# Patient Record
Sex: MALE | Race: WHITE | Employment: OTHER | URBAN - METROPOLITAN AREA
[De-identification: names, ages, dates, MRNs, and addresses within clinical notes are randomized per-mention and may not be internally consistent; named-entity substitution may affect disease eponyms.]

---

## 2018-03-26 ENCOUNTER — APPOINTMENT (OUTPATIENT)
Dept: CT IMAGING | Age: 63
DRG: 056 | End: 2018-03-26
Attending: EMERGENCY MEDICINE
Payer: COMMERCIAL

## 2018-03-26 ENCOUNTER — HOSPITAL ENCOUNTER (INPATIENT)
Age: 63
LOS: 1 days | Discharge: HOME | DRG: 056 | End: 2018-03-28
Attending: EMERGENCY MEDICINE | Admitting: INTERNAL MEDICINE
Payer: COMMERCIAL

## 2018-03-26 ENCOUNTER — APPOINTMENT (OUTPATIENT)
Dept: GENERAL RADIOLOGY | Age: 63
DRG: 056 | End: 2018-03-26
Attending: EMERGENCY MEDICINE
Payer: COMMERCIAL

## 2018-03-26 DIAGNOSIS — F02.80 DEMENTIA IN ALZHEIMER'S DISEASE WITH EARLY ONSET WITHOUT BEHAVIORAL DISTURBANCE (HCC): ICD-10-CM

## 2018-03-26 DIAGNOSIS — I65.23 BILATERAL CAROTID ARTERY STENOSIS: ICD-10-CM

## 2018-03-26 DIAGNOSIS — G93.40 ENCEPHALOPATHY ACUTE: ICD-10-CM

## 2018-03-26 DIAGNOSIS — G30.0 DEMENTIA IN ALZHEIMER'S DISEASE WITH EARLY ONSET WITHOUT BEHAVIORAL DISTURBANCE (HCC): ICD-10-CM

## 2018-03-26 DIAGNOSIS — R41.0 DISORIENTATION: Primary | ICD-10-CM

## 2018-03-26 DIAGNOSIS — G45.4 TGA (TRANSIENT GLOBAL AMNESIA): ICD-10-CM

## 2018-03-26 DIAGNOSIS — R56.9 CONVULSIONS, UNSPECIFIED CONVULSION TYPE (HCC): ICD-10-CM

## 2018-03-26 DIAGNOSIS — R41.82 ALTERED MENTAL STATUS, UNSPECIFIED ALTERED MENTAL STATUS TYPE: ICD-10-CM

## 2018-03-26 DIAGNOSIS — G93.40 ENCEPHALOPATHY: ICD-10-CM

## 2018-03-26 LAB
ALBUMIN SERPL-MCNC: 3.6 G/DL (ref 3.5–5)
ALBUMIN/GLOB SERPL: 1.1 {RATIO} (ref 1.1–2.2)
ALP SERPL-CCNC: 73 U/L (ref 45–117)
ALT SERPL-CCNC: 14 U/L (ref 12–78)
AMPHET UR QL SCN: NEGATIVE
ANION GAP SERPL CALC-SCNC: 10 MMOL/L (ref 5–15)
APPEARANCE UR: CLEAR
AST SERPL-CCNC: 10 U/L (ref 15–37)
BACTERIA URNS QL MICRO: NEGATIVE /HPF
BARBITURATES UR QL SCN: NEGATIVE
BASOPHILS # BLD: 0 K/UL (ref 0–0.1)
BASOPHILS NFR BLD: 0 % (ref 0–1)
BENZODIAZ UR QL: NEGATIVE
BILIRUB SERPL-MCNC: 0.5 MG/DL (ref 0.2–1)
BILIRUB UR QL: NEGATIVE
BUN SERPL-MCNC: 20 MG/DL (ref 6–20)
BUN/CREAT SERPL: 16 (ref 12–20)
CALCIUM SERPL-MCNC: 9.4 MG/DL (ref 8.5–10.1)
CANNABINOIDS UR QL SCN: NEGATIVE
CHLORIDE SERPL-SCNC: 101 MMOL/L (ref 97–108)
CO2 SERPL-SCNC: 26 MMOL/L (ref 21–32)
COCAINE UR QL SCN: NEGATIVE
COLOR UR: ABNORMAL
CREAT SERPL-MCNC: 1.23 MG/DL (ref 0.7–1.3)
DIFFERENTIAL METHOD BLD: ABNORMAL
DRUG SCRN COMMENT,DRGCM: ABNORMAL
EOSINOPHIL # BLD: 0 K/UL (ref 0–0.4)
EOSINOPHIL NFR BLD: 0 % (ref 0–7)
EPITH CASTS URNS QL MICRO: ABNORMAL /LPF
ERYTHROCYTE [DISTWIDTH] IN BLOOD BY AUTOMATED COUNT: 12.7 % (ref 11.5–14.5)
ETHANOL SERPL-MCNC: <10 MG/DL
GLOBULIN SER CALC-MCNC: 3.4 G/DL (ref 2–4)
GLUCOSE BLD STRIP.AUTO-MCNC: 123 MG/DL (ref 65–100)
GLUCOSE SERPL-MCNC: 117 MG/DL (ref 65–100)
GLUCOSE UR STRIP.AUTO-MCNC: NEGATIVE MG/DL
HCT VFR BLD AUTO: 33.7 % (ref 36.6–50.3)
HGB BLD-MCNC: 11.4 G/DL (ref 12.1–17)
HGB UR QL STRIP: NEGATIVE
HYALINE CASTS URNS QL MICRO: ABNORMAL /LPF (ref 0–5)
IMM GRANULOCYTES # BLD: 0 K/UL (ref 0–0.04)
IMM GRANULOCYTES NFR BLD AUTO: 0 % (ref 0–0.5)
KETONES UR QL STRIP.AUTO: NEGATIVE MG/DL
LEUKOCYTE ESTERASE UR QL STRIP.AUTO: ABNORMAL
LYMPHOCYTES # BLD: 0.6 K/UL (ref 0.8–3.5)
LYMPHOCYTES NFR BLD: 11 % (ref 12–49)
MCH RBC QN AUTO: 33.8 PG (ref 26–34)
MCHC RBC AUTO-ENTMCNC: 33.8 G/DL (ref 30–36.5)
MCV RBC AUTO: 100 FL (ref 80–99)
METHADONE UR QL: NEGATIVE
MONOCYTES # BLD: 0.6 K/UL (ref 0–1)
MONOCYTES NFR BLD: 10 % (ref 5–13)
NEUTS SEG # BLD: 4.4 K/UL (ref 1.8–8)
NEUTS SEG NFR BLD: 79 % (ref 32–75)
NITRITE UR QL STRIP.AUTO: NEGATIVE
NRBC # BLD: 0 K/UL (ref 0–0.01)
NRBC BLD-RTO: 0 PER 100 WBC
OPIATES UR QL: POSITIVE
PCP UR QL: NEGATIVE
PH UR STRIP: 7.5 [PH] (ref 5–8)
PLATELET # BLD AUTO: 164 K/UL (ref 150–400)
PMV BLD AUTO: 10.2 FL (ref 8.9–12.9)
POTASSIUM SERPL-SCNC: 4.2 MMOL/L (ref 3.5–5.1)
PROT SERPL-MCNC: 7 G/DL (ref 6.4–8.2)
PROT UR STRIP-MCNC: ABNORMAL MG/DL
RBC # BLD AUTO: 3.37 M/UL (ref 4.1–5.7)
RBC #/AREA URNS HPF: ABNORMAL /HPF (ref 0–5)
RBC MORPH BLD: ABNORMAL
SERVICE CMNT-IMP: ABNORMAL
SODIUM SERPL-SCNC: 137 MMOL/L (ref 136–145)
SP GR UR REFRACTOMETRY: 1.02 (ref 1–1.03)
UA: UC IF INDICATED,UAUC: ABNORMAL
UROBILINOGEN UR QL STRIP.AUTO: 1 EU/DL (ref 0.2–1)
WBC # BLD AUTO: 5.6 K/UL (ref 4.1–11.1)
WBC URNS QL MICRO: ABNORMAL /HPF (ref 0–4)

## 2018-03-26 PROCEDURE — 80307 DRUG TEST PRSMV CHEM ANLYZR: CPT | Performed by: EMERGENCY MEDICINE

## 2018-03-26 PROCEDURE — 36415 COLL VENOUS BLD VENIPUNCTURE: CPT | Performed by: EMERGENCY MEDICINE

## 2018-03-26 PROCEDURE — 71046 X-RAY EXAM CHEST 2 VIEWS: CPT

## 2018-03-26 PROCEDURE — 81001 URINALYSIS AUTO W/SCOPE: CPT | Performed by: EMERGENCY MEDICINE

## 2018-03-26 PROCEDURE — 82962 GLUCOSE BLOOD TEST: CPT

## 2018-03-26 PROCEDURE — 85025 COMPLETE CBC W/AUTO DIFF WBC: CPT | Performed by: EMERGENCY MEDICINE

## 2018-03-26 PROCEDURE — 70450 CT HEAD/BRAIN W/O DYE: CPT

## 2018-03-26 PROCEDURE — 80053 COMPREHEN METABOLIC PANEL: CPT | Performed by: EMERGENCY MEDICINE

## 2018-03-26 PROCEDURE — 94761 N-INVAS EAR/PLS OXIMETRY MLT: CPT

## 2018-03-26 PROCEDURE — 99285 EMERGENCY DEPT VISIT HI MDM: CPT

## 2018-03-26 PROCEDURE — 87086 URINE CULTURE/COLONY COUNT: CPT | Performed by: EMERGENCY MEDICINE

## 2018-03-26 PROCEDURE — 93005 ELECTROCARDIOGRAM TRACING: CPT

## 2018-03-26 NOTE — IP AVS SNAPSHOT
[address] 
[phone] Patient: Hayley Choudhury MRN: [id number] :1955 About your hospitalization You were admitted on:  2018 You last received care in the:  \[facility]\"" 3 NEUROSCIENCE TELEMETRY You were discharged on:  2018 Why you were hospitalized Your primary diagnosis was:  Not on File Your diagnoses also included:  Encephalopathy, Dementia In Alzheimer's Disease With Early Onset Without Behavioral Disturbance, Altered Mental Status, Unspecified, Bilateral Carotid Artery Stenosis, Tga (Transient Global Amnesia) Follow-up Information Follow up With Details Comments Contact Info Laura Magana MD Go on 4/3/2018 Please follow up on April 3, 2018 at 12:30 Cora [address] [place] [address] 
[phone] Discharge Orders None A check leo indicates which time of day the medication should be taken. My Medications START taking these medications Instructions Each Dose to Equal  
 Morning Noon Evening Bedtime  
 donepezil 5 mg tablet Commonly known as:  ARICEPT Your last dose was: Your next dose is: Take 1 Tab by mouth nightly for 30 days. 5 mg CONTINUE taking these medications Instructions Each Dose to Equal  
 Morning Noon Evening Bedtime ABILIFY 5 mg tablet Generic drug:  ARIPiprazole Your last dose was: Your next dose is: Take 5 mg by mouth daily. 5 mg  
    
   
   
   
  
 amLODIPine 5 mg tablet Commonly known as:  Tylene Quoc Your last dose was: Your next dose is: Take 5 mg by mouth daily. 5 mg  
    
   
   
   
  
 buprenorphine-naloxone 8-2 mg Film sublingaul film Commonly known as:  SUBOXONE Your last dose was: Your next dose is:    
   
   
 1 Film by SubLINGual route two (2) times a day. 1 Film CYMBALTA 60 mg capsule Generic drug:  DULoxetine Your last dose was: Your next dose is: Take 60 mg by mouth daily. 60 mg  
    
   
   
   
  
 eszopiclone 3 mg tablet Commonly known as:  Amina Lopez Your last dose was: Your next dose is: Take 3 mg by mouth nightly. 3 mg  
    
   
   
   
  
 folic acid 1 mg tablet Commonly known as:  Google Your last dose was: Your next dose is: Take 1 mg by mouth daily. 1 mg  
    
   
   
   
  
 gabapentin 300 mg capsule Commonly known as:  NEURONTIN Your last dose was: Your next dose is: Take 300 mg by mouth nightly. 300 mg LaMICtal 25 mg tablet Generic drug:  lamoTRIgine Your last dose was: Your next dose is: Take 25 mg by mouth daily. 25 mg  
    
   
   
   
  
 LASIX 20 mg tablet Generic drug:  furosemide Your last dose was: Your next dose is: Take 20 mg by mouth daily. 20 mg  
    
   
   
   
  
 lisinopril-hydroCHLOROthiazide 10-12.5 mg per tablet Commonly known as:  Fredrick Rival Your last dose was: Your next dose is: Take 1 Tab by mouth daily. 1 Tab PROAIR HFA 90 mcg/actuation inhaler Generic drug:  albuterol Your last dose was: Your next dose is: Take 2 Puffs by inhalation every four (4) hours as needed for Wheezing. 2 Puff SINGULAIR 10 mg tablet Generic drug:  montelukast  
   
Your last dose was: Your next dose is: Take 10 mg by mouth daily. 10 mg  
    
   
   
   
  
 spironolactone 50 mg tablet Commonly known as:  ALDACTONE Your last dose was: Your next dose is: Take 50 mg by mouth daily. 50 mg  
    
   
   
   
  
 traZODone 150 mg tablet Commonly known as:  Cassie Boldemartobi Your last dose was: Your next dose is: Take 300 mg by mouth nightly. 300 mg ZYLOPRIM 300 mg tablet Generic drug:  allopurinol Your last dose was: Your next dose is: Take 300 mg by mouth daily. 300 mg Where to Get Your Medications Information on where to get these meds will be given to you by the nurse or doctor. ! Ask your nurse or doctor about these medications  
  donepezil 5 mg tablet Opioid Education Prescription Opioids: What You Need to Know: 
 
Prescription opioids can be used to help relieve moderate-to-severe pain and are often prescribed following a surgery or injury, or for certain health conditions. These medications can be an important part of treatment but also come with serious risks. Opioids are strong pain medicines. Examples include hydrocodone, oxycodone, fentanyl, and morphine. Heroin is an example of an illegal opioid. It is important to work with your health care provider to make sure you are getting the safest, most effective care. WHAT ARE THE RISKS AND SIDE EFFECTS OF OPIOID USE? Prescription opioids carry serious risks of addiction and overdose, especially with prolonged use. An opioid overdose, often marked by slow breathing, can cause sudden death. The use of prescription opioids can have a number of side effects as well, even when taken as directed. · Tolerance-meaning you might need to take more of a medication for the same pain relief · Physical dependence-meaning you have symptoms of withdrawal when the medication is stopped. Withdrawal symptoms can include nausea, sweating, chills, diarrhea, stomach cramps, and muscle aches. Withdrawal can last up to several weeks, depending on which drug you took and how long you took it. · Increased sensitivity to pain · Constipation · Nausea, vomiting, and dry mouth · Sleepiness and dizziness · Confusion · Depression · Low levels of testosterone that can result in lower sex drive, energy, and strength · Itching and sweating RISKS ARE GREATER WITH:      
· History of drug misuse, substance use disorder, or overdose · Mental health conditions (such as depression or anxiety) · Sleep apnea · Older age (72 years or older) · Pregnancy Avoid alcohol while taking prescription opioids. Also, unless specifically advised by your health care provider, medications to avoid include: · Benzodiazepines (such as Xanax or Valium) · Muscle relaxants (such as Soma or Flexeril) · Hypnotics (such as Ambien or Lunesta) · Other prescription opioids KNOW YOUR OPTIONS Talk to your health care provider about ways to manage your pain that don't involve prescription opioids. Some of these options may actually work better and have fewer risks and side effects. Options may include: 
· Pain relievers such as acetaminophen, ibuprofen, and naproxen · Some medications that are also used for depression or seizures · Physical therapy and exercise · Counseling to help patients learn how to cope better with triggers of pain and stress. · Application of heat or cold compress · Massage therapy · Relaxation techniques Be Informed Make sure you know the name of your medication, how much and how often to take it, and its potential risks & side effects. IF YOU ARE PRESCRIBED OPIOIDS FOR PAIN: 
· Never take opioids in greater amounts or more often than prescribed. Remember the goal is not to be pain-free but to manage your pain at a tolerable level. · Follow up with your primary care provider to: · Work together to create a plan on how to manage your pain. · Talk about ways to help manage your pain that don't involve prescription opioids. · Talk about any and all concerns and side effects. · Help prevent misuse and abuse. · Never sell or share prescription opioids · Help prevent misuse and abuse. · Store prescription opioids in a secure place and out of reach of others (this may include visitors, children, friends, and family). · Safely dispose of unused/unwanted prescription opioids: Find your community drug take-back program or your pharmacy mail-back program, or flush them down the toilet, following guidance from the Food and Drug Administration (www.fda.gov/Drugs/ResourcesForYou). · Visit www.cdc.gov/drugoverdose to learn about the risks of opioid abuse and overdose. · If you believe you may be struggling with addiction, tell your health care provider and ask for guidance or call [facility] at 1-845-570-UYGV. Discharge Instructions None GeekChicDaily Announcement We are excited to announce that we are making your provider's discharge notes available to you in GeekChicDaily. You will see these notes when they are completed and signed by the physician that discharged you from your recent hospital stay. If you have any questions or concerns about any information you see in GeekChicDaily, please call the Health Information Department where you were seen or reach out to your Primary Care Provider for more information about your plan of care. Introducing [facility]! [facility] introduces GeekChicDaily patient portal. Now you can access parts of your medical record, email your doctor's office, and request medication refills online. 1. In your internet browser, go to [email or web address] 2. Click on the First Time User? Click Here link in the Sign In box. You will see the New Member Sign Up page. 3. Enter your GeekChicDaily Access Code exactly as it appears below. You will not need to use this code after youve completed the sign-up process. If you do not sign up before the expiration date, you must request a new code. · Context app Access Code: [email or web address] Expires: 6/25/2018  5:25 PM 
 
4. Enter the last four digits of your Social Security Number (xxxx) and Date of Birth (mm/dd/yyyy) as indicated and click Submit. You will be taken to the next sign-up page. 5. Create a Locondo.jpt ID. This will be your Context app login ID and cannot be changed, so think of one that is secure and easy to remember. 6. Create a Context app password. You can change your password at any time. 7. Enter your Password Reset Question and Answer. This can be used at a later time if you forget your password. 8. Enter your e-mail address. You will receive e-mail notification when new information is available in [address] 9. Click Sign Up. You can now view and download portions of your medical record. 10. Click the Download Summary menu link to download a portable copy of your medical information. If you have questions, please visit the Frequently Asked Questions section of the Context app website. Remember, Context app is NOT to be used for urgent needs. For medical emergencies, dial 911. Now available from your iPhone and Android! Introducing Bharathi Brink As a Jocelin José patient, I wanted to make you aware of our electronic visit tool called Bharathi Richardmargo. Jocelin Clintonne 24/7 allows you to connect within minutes with a medical provider 24 hours a day, seven days a week via a mobile device or tablet or logging into a secure website from your computer. You can access Bharathi Cuba from anywhere in the [place]. A virtual visit might be right for you when you have a simple condition and feel like you just dont want to get out of bed, or cant get away from work for an appointment, when your regular Jocelin Benne provider is not available (evenings, weekends or holidays), or when youre out of town and need minor care.   Electronic visits cost only $49 and if the [facility] StarMobile 24/7 provider determines a prescription is needed to treat your condition, one can be electronically transmitted to a nearby pharmacy*. Please take a moment to enroll today if you have not already done so. The enrollment process is free and takes just a few minutes. To enroll, please download the Swiftcourt/Particle Code katina to your tablet or phone, or visit [email or web address] to enroll on your computer. And, as an [address] [place] [address] patient with a Odersun account, the results of your visits will be scanned into your electronic medical record and your primary care provider will be able to view the scanned results. We urge you to continue to see your regular [facility] provider for your ongoing medical care. And while your primary care provider may not be the one available when you seek a Innohat virtual visit, the peace of mind you get from getting a real diagnosis real time can be priceless. For more information on Innohat, view our Frequently Asked Questions (FAQs) at [email or web address]. Sincerely, 
 
El Macario MD 
Chief Medical Officer [facility] Natasha Richardson *:  certain medications cannot be prescribed via Innohat Unresulted Labs-Please follow up with your PCP about these lab tests Order Current Status JUDE QL, W/REFLEX CASCADE In process ANTINEURONAL CELL AB In process VITAMIN D, 25 HYROXY PANEL In process Providers Seen During Your Hospitalization Provider Specialty Primary office phone David Sexton MD Emergency Medicine [phone] Traci Braun MD Internal Medicine [phone] Janet Hodgson MD Internal Medicine [phone] Your Primary Care Physician (PCP) Primary Care Physician Office Phone Office Fax Alton Ayala [phone] You are allergic to the following No active allergies Recent Documentation Height Weight BMI Smoking Status 1.778 m 81.6 kg 25.83 kg/m2 Never Smoker Emergency Contacts Name Discharge Info Relation Home Work Mobile Lashay Corado N/A  AT THIS TIME [6] Spouse [3] [phone] Lockjaydakimberly Negro N/A  AT THIS TIME [6] Son [22] [phone] Patient Belongings The following personal items are in your possession at time of discharge: 
  Dental Appliances: None  Visual Aid: Glasses, At home      Home Medications: None   Jewelry: None  Clothing: At bedside    Other Valuables: Cell Phone, [employer] Please provide this summary of care documentation to your next provider. Signatures-by signing, you are acknowledging that this After Visit Summary has been reviewed with you and you have received a copy. Patient Signature:  ____________________________________________________________ Date:  ____________________________________________________________  
  
[facility] Provider Signature:  ____________________________________________________________ Date:  ____________________________________________________________

## 2018-03-26 NOTE — IP AVS SNAPSHOT
[address] [facility] 
[phone] Patient: Jesus Brito MRN: [id number] :1955 A check leo indicates which time of day the medication should be taken. My Medications ASK your doctor about these medications Instructions Each Dose to Equal  
 Morning Noon Evening Bedtime ABILIFY 5 mg tablet Generic drug:  ARIPiprazole Your last dose was: Your next dose is: Take 5 mg by mouth daily. 5 mg  
    
   
   
   
  
 amLODIPine 5 mg tablet Commonly known as:  Talya Ro Your last dose was: Your next dose is: Take 5 mg by mouth daily. 5 mg  
    
   
   
   
  
 buprenorphine-naloxone 8-2 mg Film sublingaul film Commonly known as:  SUBOXONE Your last dose was: Your next dose is:    
   
   
 1 Film by SubLINGual route two (2) times a day. 1 Film CYMBALTA 60 mg capsule Generic drug:  DULoxetine Your last dose was: Your next dose is: Take 60 mg by mouth daily. 60 mg  
    
   
   
   
  
 eszopiclone 3 mg tablet Commonly known as:  Keagan Roblero Your last dose was: Your next dose is: Take 3 mg by mouth nightly. 3 mg  
    
   
   
   
  
 folic acid 1 mg tablet Commonly known as:  Google Your last dose was: Your next dose is: Take 1 mg by mouth daily. 1 mg  
    
   
   
   
  
 gabapentin 300 mg capsule Commonly known as:  NEURONTIN Your last dose was: Your next dose is: Take 300 mg by mouth nightly. 300 mg LaMICtal 25 mg tablet Generic drug:  lamoTRIgine Your last dose was: Your next dose is: Take 25 mg by mouth daily. 25 mg  
    
   
   
   
  
 LASIX 20 mg tablet Generic drug:  furosemide Your last dose was: Your next dose is: Take 20 mg by mouth daily. 20 mg  
    
   
   
   
  
 lisinopril-hydroCHLOROthiazide 10-12.5 mg per tablet Commonly known as:  Luz Matute Your last dose was: Your next dose is: Take 1 Tab by mouth daily. 1 Tab PROAIR HFA 90 mcg/actuation inhaler Generic drug:  albuterol Your last dose was: Your next dose is: Take 2 Puffs by inhalation every four (4) hours as needed for Wheezing. 2 Puff SINGULAIR 10 mg tablet Generic drug:  montelukast  
   
Your last dose was: Your next dose is: Take 10 mg by mouth daily. 10 mg  
    
   
   
   
  
 spironolactone 50 mg tablet Commonly known as:  ALDACTONE Your last dose was: Your next dose is: Take 50 mg by mouth daily. 50 mg  
    
   
   
   
  
 traZODone 150 mg tablet Commonly known as:  Jose A Hernandez Your last dose was: Your next dose is: Take 300 mg by mouth nightly. 300 mg ZYLOPRIM 300 mg tablet Generic drug:  allopurinol Your last dose was: Your next dose is: Take 300 mg by mouth daily.   
 300 mg

## 2018-03-27 ENCOUNTER — APPOINTMENT (OUTPATIENT)
Dept: MRI IMAGING | Age: 63
DRG: 056 | End: 2018-03-27
Attending: PSYCHIATRY & NEUROLOGY
Payer: COMMERCIAL

## 2018-03-27 PROBLEM — G30.0 DEMENTIA IN ALZHEIMER'S DISEASE WITH EARLY ONSET WITHOUT BEHAVIORAL DISTURBANCE (HCC): Status: ACTIVE | Noted: 2018-03-27

## 2018-03-27 PROBLEM — G93.40 ENCEPHALOPATHY: Status: ACTIVE | Noted: 2018-03-27

## 2018-03-27 PROBLEM — G45.4 TGA (TRANSIENT GLOBAL AMNESIA): Status: ACTIVE | Noted: 2018-03-27

## 2018-03-27 PROBLEM — F02.80 DEMENTIA IN ALZHEIMER'S DISEASE WITH EARLY ONSET WITHOUT BEHAVIORAL DISTURBANCE (HCC): Status: ACTIVE | Noted: 2018-03-27

## 2018-03-27 PROBLEM — R41.82 ALTERED MENTAL STATUS, UNSPECIFIED: Status: ACTIVE | Noted: 2018-03-27

## 2018-03-27 PROBLEM — I65.23 BILATERAL CAROTID ARTERY STENOSIS: Status: ACTIVE | Noted: 2018-03-27

## 2018-03-27 LAB
ATRIAL RATE: 105 BPM
CALCULATED P AXIS, ECG09: 15 DEGREES
CALCULATED R AXIS, ECG10: -9 DEGREES
CALCULATED T AXIS, ECG11: 10 DEGREES
DIAGNOSIS, 93000: NORMAL
P-R INTERVAL, ECG05: 168 MS
Q-T INTERVAL, ECG07: 350 MS
QRS DURATION, ECG06: 70 MS
QTC CALCULATION (BEZET), ECG08: 462 MS
TSH SERPL DL<=0.05 MIU/L-ACNC: 4.84 UIU/ML (ref 0.36–3.74)
VENTRICULAR RATE, ECG03: 105 BPM
VIT B12 SERPL-MCNC: 301 PG/ML (ref 211–911)

## 2018-03-27 PROCEDURE — 84443 ASSAY THYROID STIM HORMONE: CPT | Performed by: INTERNAL MEDICINE

## 2018-03-27 PROCEDURE — 65270000029 HC RM PRIVATE

## 2018-03-27 PROCEDURE — 77030029684 HC NEB SM VOL KT MONA -A

## 2018-03-27 PROCEDURE — 70551 MRI BRAIN STEM W/O DYE: CPT

## 2018-03-27 PROCEDURE — 95816 EEG AWAKE AND DROWSY: CPT | Performed by: PSYCHIATRY & NEUROLOGY

## 2018-03-27 PROCEDURE — 82607 VITAMIN B-12: CPT | Performed by: INTERNAL MEDICINE

## 2018-03-27 PROCEDURE — 74011000250 HC RX REV CODE- 250: Performed by: GENERAL ACUTE CARE HOSPITAL

## 2018-03-27 PROCEDURE — 36415 COLL VENOUS BLD VENIPUNCTURE: CPT | Performed by: INTERNAL MEDICINE

## 2018-03-27 PROCEDURE — 94640 AIRWAY INHALATION TREATMENT: CPT

## 2018-03-27 RX ORDER — BUPRENORPHINE AND NALOXONE 8; 2 MG/1; MG/1
1 FILM, SOLUBLE BUCCAL; SUBLINGUAL 2 TIMES DAILY
COMMUNITY

## 2018-03-27 RX ORDER — LISINOPRIL AND HYDROCHLOROTHIAZIDE 10; 12.5 MG/1; MG/1
1 TABLET ORAL DAILY
COMMUNITY

## 2018-03-27 RX ORDER — IPRATROPIUM BROMIDE AND ALBUTEROL SULFATE 2.5; .5 MG/3ML; MG/3ML
3 SOLUTION RESPIRATORY (INHALATION)
Status: DISCONTINUED | OUTPATIENT
Start: 2018-03-27 | End: 2018-03-28 | Stop reason: HOSPADM

## 2018-03-27 RX ORDER — MONTELUKAST SODIUM 10 MG/1
10 TABLET ORAL DAILY
COMMUNITY

## 2018-03-27 RX ORDER — FOLIC ACID 1 MG/1
1 TABLET ORAL DAILY
COMMUNITY

## 2018-03-27 RX ORDER — ALBUTEROL SULFATE 90 UG/1
2 AEROSOL, METERED RESPIRATORY (INHALATION)
COMMUNITY

## 2018-03-27 RX ORDER — ONDANSETRON 2 MG/ML
4 INJECTION INTRAMUSCULAR; INTRAVENOUS
Status: DISCONTINUED | OUTPATIENT
Start: 2018-03-27 | End: 2018-03-28 | Stop reason: HOSPADM

## 2018-03-27 RX ORDER — DULOXETIN HYDROCHLORIDE 60 MG/1
60 CAPSULE, DELAYED RELEASE ORAL DAILY
COMMUNITY

## 2018-03-27 RX ORDER — ACETAMINOPHEN 325 MG/1
650 TABLET ORAL
Status: DISCONTINUED | OUTPATIENT
Start: 2018-03-27 | End: 2018-03-28 | Stop reason: HOSPADM

## 2018-03-27 RX ORDER — ESZOPICLONE 3 MG/1
3 TABLET, FILM COATED ORAL
COMMUNITY

## 2018-03-27 RX ORDER — ARIPIPRAZOLE 5 MG/1
5 TABLET ORAL DAILY
COMMUNITY

## 2018-03-27 RX ORDER — LAMOTRIGINE 25 MG/1
25 TABLET ORAL DAILY
COMMUNITY

## 2018-03-27 RX ORDER — SODIUM CHLORIDE 0.9 % (FLUSH) 0.9 %
5-10 SYRINGE (ML) INJECTION EVERY 8 HOURS
Status: DISCONTINUED | OUTPATIENT
Start: 2018-03-27 | End: 2018-03-28 | Stop reason: HOSPADM

## 2018-03-27 RX ORDER — ALLOPURINOL 300 MG/1
300 TABLET ORAL DAILY
COMMUNITY

## 2018-03-27 RX ORDER — AMLODIPINE BESYLATE 5 MG/1
5 TABLET ORAL DAILY
COMMUNITY

## 2018-03-27 RX ORDER — FACIAL-BODY WIPES
10 EACH TOPICAL DAILY PRN
Status: DISCONTINUED | OUTPATIENT
Start: 2018-03-27 | End: 2018-03-28 | Stop reason: HOSPADM

## 2018-03-27 RX ORDER — TRAZODONE HYDROCHLORIDE 150 MG/1
300 TABLET ORAL
COMMUNITY

## 2018-03-27 RX ORDER — SODIUM CHLORIDE 0.9 % (FLUSH) 0.9 %
5-10 SYRINGE (ML) INJECTION AS NEEDED
Status: DISCONTINUED | OUTPATIENT
Start: 2018-03-27 | End: 2018-03-28 | Stop reason: HOSPADM

## 2018-03-27 RX ORDER — HYDRALAZINE HYDROCHLORIDE 20 MG/ML
10 INJECTION INTRAMUSCULAR; INTRAVENOUS
Status: DISCONTINUED | OUTPATIENT
Start: 2018-03-27 | End: 2018-03-28 | Stop reason: HOSPADM

## 2018-03-27 RX ORDER — SPIRONOLACTONE 50 MG/1
50 TABLET, FILM COATED ORAL DAILY
COMMUNITY

## 2018-03-27 RX ORDER — GABAPENTIN 300 MG/1
300 CAPSULE ORAL
COMMUNITY

## 2018-03-27 RX ORDER — FUROSEMIDE 20 MG/1
20 TABLET ORAL DAILY
COMMUNITY

## 2018-03-27 RX ADMIN — IPRATROPIUM BROMIDE AND ALBUTEROL SULFATE 3 ML: .5; 3 SOLUTION RESPIRATORY (INHALATION) at 23:23

## 2018-03-27 RX ADMIN — IPRATROPIUM BROMIDE AND ALBUTEROL SULFATE 3 ML: .5; 3 SOLUTION RESPIRATORY (INHALATION) at 20:22

## 2018-03-27 RX ADMIN — Medication 10 ML: at 13:29

## 2018-03-27 NOTE — PROGRESS NOTES
Pharmacy Medication Reconciliation     The patient was interviewed regarding current PTA medication list, use and drug allergies; Patient present in room and obtained permission from patient to discuss drug regimen with visitor(s) present. The patient was questioned regarding use of any other inhalers, topical products, over the counter medications, herbal medications, vitamin products or ophthalmic/nasal/otic medication use. Allergy Update: None    Recommendations/Findings: The following amendments were made to the patient's [facility] [address] [facility]:   1) Additions: See below, wife to bring in pill bottles which can be checked against list below obtained from pharmacies.      -Clarified PTA med list with [facility] pharmacy [phone], [address] [place] [address] [phone]. PTA medication list was corrected to the following:     Prior to Admission Medications   Prescriptions Last Dose Informant Patient Reported? Taking? ARIPiprazole (ABILIFY) 5 mg tablet 3/20/2018 at Unknown time  Yes Yes   Sig: Take 5 mg by mouth daily. DULoxetine (CYMBALTA) 60 mg capsule 3/20/2018 at Unknown time  Yes Yes   Sig: Take 60 mg by mouth daily. albuterol (PROAIR HFA) 90 mcg/actuation inhaler 3/20/2018 at Unknown time  Yes Yes   Sig: Take 2 Puffs by inhalation every four (4) hours as needed for Wheezing. allopurinol (ZYLOPRIM) 300 mg tablet 3/20/2018 at Unknown time  Yes Yes   Sig: Take 300 mg by mouth daily. amLODIPine (NORVASC) 5 mg tablet 3/20/2018 at Unknown time  Yes Yes   Sig: Take 5 mg by mouth daily. buprenorphine-naloxone (SUBOXONE) 8-2 mg film sublingaul film 3/20/2018 at Unknown time  Yes Yes   Si Film by SubLINGual route two (2) times a day. eszopiclone (LUNESTA) 3 mg tablet 3/20/2018 at Unknown time  Yes Yes   Sig: Take 3 mg by mouth nightly. folic acid (FOLVITE) 1 mg tablet 3/20/2018 at Unknown time  Yes Yes   Sig: Take 1 mg by mouth daily.    furosemide (LASIX) 20 mg tablet 3/20/2018 at Unknown time  Yes Yes   Sig: Take 20 mg by mouth daily. gabapentin (NEURONTIN) 300 mg capsule 3/20/2018 at Unknown time  Yes Yes   Sig: Take 300 mg by mouth nightly. lamoTRIgine (LAMICTAL) 25 mg tablet 3/20/2018 at Unknown time  Yes Yes   Sig: Take 25 mg by mouth daily. lisinopril-hydroCHLOROthiazide (PRINZIDE, ZESTORETIC) 10-12.5 mg per tablet 3/20/2018 at Unknown time  Yes Yes   Sig: Take 1 Tab by mouth daily. montelukast (SINGULAIR) 10 mg tablet 3/20/2018 at Unknown time  Yes Yes   Sig: Take 10 mg by mouth daily. spironolactone (ALDACTONE) 50 mg tablet 3/20/2018 at Unknown time  Yes Yes   Sig: Take 50 mg by mouth daily. traZODone (DESYREL) 150 mg tablet 3/20/2018 at Unknown time  Yes Yes   Sig: Take 300 mg by mouth nightly.       Facility-Administered Medications: None          Thank you,  Cristofer Parker, PHARMD

## 2018-03-27 NOTE — ROUTINE PROCESS
* No surgery found *  * No surgery found *  Bedside shift change report given to Karin RN (oncoming nurse) by Dennis Hicks RN (offgoing nurse). Report included the following information SBAR, Kardex and ED Summary. Zone Phone:   [phone]      Significant changes during shift:  Awaiting MRI, Duplex and EEG        Patient Information    Monico Way  63 y.o.  3/26/2018  8:56 PM by Belen Perales MD. Monico Way was admitted from Home    Problem List    Patient Active Problem List    Diagnosis Date Noted    Encephalopathy 03/27/2018    Dementia in Alzheimer's disease with early onset without behavioral disturbance 03/27/2018    Altered mental status, unspecified 03/27/2018    Bilateral carotid artery stenosis 03/27/2018    TGA (transient global amnesia) 03/27/2018     Past Medical History:   Diagnosis Date    Hypertension     Memory loss     Organic heart disease, NYHA class 1          Core Measures:    CVA: No No  CHF:No No  PNA:No No    Post Op Surgical (If Applicable):      ambulated in hallway past shift:  yes  OOB to chair past shift:   yes  NG Tube: No  Incentive Spirometer: No  Drains: No   Volume    Dressing Present:  No  Flatus:  yes    Activity Status:    OOB to Chair Yes  Ambulated this shift Yes   Bed Rest No    Supplemental O2: (If Applicable)    NC No  NRB No  Venti-mask No  On  Liters/min      LINES AND DRAINS:    PIV only    DVT prophylaxis:    DVT prophylaxis Med- No  DVT prophylaxis SCD or CHANO- No     Wounds: (If Applicable)    Wounds- No    Location     Patient Safety:    Falls Score Total Score: 0  Safety Level_______  Bed Alarm On? No  Sitter?  No    Plan for upcoming shift: safety,        Discharge Plan: Yes ongoing    Active Consults:  None

## 2018-03-27 NOTE — ED NOTES
Bedside shift change report given to Randall Kahn RN (oncoming nurse) by Pop Light RN (offgoing nurse). Report included the following information SBAR, Kardex, ED Summary, Intake/Output, MAR, Recent Results and Cardiac Rhythm NSR.

## 2018-03-27 NOTE — PROGRESS NOTES
Awaiting MRI  Will review with the results. Pt wanting to go home today, advised that we need to finish w/u. He does have memory loss and is impulsive, but wife says she is fine to manage him at home, and she hides the car keys.

## 2018-03-27 NOTE — ED NOTES
[facility]: Wife [phone]    Wife will call back with information like his medical history and daily medications. Wife reports she will drive up here to   but it will take 3-4 hours.

## 2018-03-27 NOTE — PROGRESS NOTES
Spoke with pt's wife, she verified all information that  had given, will proceed with faxing screening sheet.  Wife on her way form [place] at this time

## 2018-03-27 NOTE — ROUTINE PROCESS
PCP LUPE appt scheduled with Dr. Tammie Jones on 4/3/2018 @12:30pm. Appt added to [address] [facility] Specialist    Appointment was already made.

## 2018-03-27 NOTE — ED NOTES
Pt resting quietly and in no acute distress at this time. Call bell within reach. Pt remains confused.

## 2018-03-27 NOTE — ED NOTES
TRANSFER - OUT REPORT:    Verbal report given to SANTA Hernandez.(name) on Lino Rajput  being transferred to Neuro (unit) for routine progression of care       Report consisted of patients Situation, Background, Assessment and   Recommendations(SBAR). Information from the following report(s) SBAR and ED Summary was reviewed with the receiving nurse. Lines:   Peripheral IV 03/26/18 Left Antecubital (Active)   Site Assessment Clean, dry, & intact 3/26/2018  9:38 PM   Phlebitis Assessment 0 3/26/2018  9:38 PM   Infiltration Assessment 0 3/26/2018  9:38 PM   Dressing Status Clean, dry, & intact 3/26/2018  9:38 PM   Dressing Type Transparent 3/26/2018  9:38 PM   Hub Color/Line Status Pink 3/26/2018  9:38 PM        Opportunity for questions and clarification was provided.       Patient transported with:   [employer]

## 2018-03-27 NOTE — ED NOTES
Spoke with pt's wife over the phone and updated. Transferred call into pt's room for pt to speak with her.

## 2018-03-27 NOTE — ED NOTES
Hospitalist at bedside to evaluate patient. Will call son or family to gather more information. Will order a cardiac diet tray.

## 2018-03-27 NOTE — ED PROVIDER NOTES
EMERGENCY DEPARTMENT HISTORY AND PHYSICAL EXAM    Date: 3/26/2018  Patient Name: Monico Way    History of Presenting Illness     Chief Complaint   Patient presents with    Altered mental status       History Provided By: Patient, Patient's Wife and EMS    HPI: Monico Way is a 63 y.o. male, who presents via EMS to the ED for evaluation after being found by police. Per EMS, the pt was found pulled over at a truck weight station confused and slow to respond PTA. EMS personnel were able to contact the pt's wife, who notes the pt lives in [place], [place]; he went to [facility] to  a car and somehow must have drove to [place] [facility]. Pt's Wife states the pt has a hx of confusion and has scheduled upcoming follow-up with neurology for a possible brain tumor or dementia. Upon examination, when asked the year, the pt replied with \"7433. ..1995. ..no, 1994. .I don't know what it is. \" When asked the month, the pt responded with \"2018. \" Pt is currently complaining of pain \"all over,\" but is unable to focalize. He reports last eating this morning. Pt specifically denies any fever, congestion, cough, shortness of breath, chest pain, abdominal pain, nausea, vomiting, diarrhea, dysuria, or urinary frequency. PCP: Not On File [facility]    PMHx: Significant for Unknown  PSHx: Significant for Unknown  Social Hx: -tobacco, -EtOH ), -Illicit Drugs     History is limited due to pt's altered Mental status. Past History     Past Medical History:  No past medical history on file. Past Surgical History:  No past surgical history on file. Family History:  No family history on file. Social History:  Social History   Substance Use Topics    Smoking status: Not on file    Smokeless tobacco: Not on file    Alcohol use Not on file       Allergies:  No Known Allergies      Review of Systems   Review of Systems   Constitutional: Negative for chills and fever. HENT: Negative. Eyes: Negative. Respiratory: Negative for cough, chest tightness and shortness of breath. Cardiovascular: Negative for chest pain and leg swelling. Gastrointestinal: Negative for abdominal pain, diarrhea, nausea and vomiting. Endocrine: Negative. Genitourinary: Negative for difficulty urinating and dysuria. Musculoskeletal: Positive for myalgias (generalized). Skin: Negative. Neurological: Negative. Psychiatric/Behavioral: Positive for confusion. All other systems reviewed and are negative. Physical Exam   Physical Exam   Constitutional: He appears well-developed and well-nourished. No distress. HENT:   Head: Normocephalic and atraumatic. Nose: Nose normal.   Mouth/Throat: No oropharyngeal exudate. Eyes: Conjunctivae and EOM are normal. Pupils are equal, round, and reactive to light. Neck: Normal range of motion. Neck supple. No JVD present. Cardiovascular: Normal rate, regular rhythm, normal heart sounds and intact distal pulses. Exam reveals no friction rub. No murmur heard. Pulmonary/Chest: Effort normal and breath sounds normal. No stridor. No respiratory distress. He has no wheezes. He has no rales. Abdominal: Soft. Bowel sounds are normal. He exhibits no distension. There is no tenderness. There is no rebound. Musculoskeletal: Normal range of motion. He exhibits no tenderness. Neurological: He is alert. He has normal strength. He is disoriented. No cranial nerve deficit or sensory deficit. Coordination and gait normal. GCS eye subscore is 4. GCS verbal subscore is 4. GCS motor subscore is 6. Skin: Skin is warm and dry. No rash noted. He is not diaphoretic. Psychiatric: He has a normal mood and affect. His speech is normal and behavior is normal. Judgment and thought content normal. Cognition and memory are normal.   Nursing note and vitals reviewed.         Diagnostic Study Results     Labs -     Recent Results (from the past 12 hour(s))   CBC WITH AUTOMATED DIFF Collection Time: 03/26/18  9:11 PM   Result Value Ref Range    WBC 5.6 4.1 - 11.1 K/uL    RBC 3.37 (L) 4.10 - 5.70 M/uL    HGB 11.4 (L) 12.1 - 17.0 g/dL    HCT 33.7 (L) 36.6 - 50.3 %    .0 (H) 80.0 - 99.0 FL    MCH 33.8 26.0 - 34.0 PG    MCHC 33.8 30.0 - 36.5 g/dL    RDW 12.7 11.5 - 14.5 %    PLATELET 881 835 - 339 K/uL    MPV 10.2 8.9 - 12.9 FL    NRBC 0.0 0  WBC    ABSOLUTE NRBC 0.00 0.00 - 0.01 K/uL    NEUTROPHILS 79 (H) 32 - 75 %    LYMPHOCYTES 11 (L) 12 - 49 %    MONOCYTES 10 5 - 13 %    EOSINOPHILS 0 0 - 7 %    BASOPHILS 0 0 - 1 %    IMMATURE GRANULOCYTES 0 0.0 - 0.5 %    ABS. NEUTROPHILS 4.4 1.8 - 8.0 K/UL    ABS. LYMPHOCYTES 0.6 (L) 0.8 - 3.5 K/UL    ABS. MONOCYTES 0.6 0.0 - 1.0 K/UL    ABS. EOSINOPHILS 0.0 0.0 - 0.4 K/UL    ABS. BASOPHILS 0.0 0.0 - 0.1 K/UL    ABS. IMM. GRANS. 0.0 0.00 - 0.04 K/UL    DF AUTOMATED      RBC COMMENTS NORMOCYTIC, NORMOCHROMIC     METABOLIC PANEL, COMPREHENSIVE    Collection Time: 03/26/18  9:11 PM   Result Value Ref Range    Sodium 137 136 - 145 mmol/L    Potassium 4.2 3.5 - 5.1 mmol/L    Chloride 101 97 - 108 mmol/L    CO2 26 21 - 32 mmol/L    Anion gap 10 5 - 15 mmol/L    Glucose 117 (H) 65 - 100 mg/dL    BUN 20 6 - 20 MG/DL    Creatinine 1.23 0.70 - 1.30 MG/DL    BUN/Creatinine ratio 16 12 - 20      GFR est AA >60 >60 ml/min/1.73m2    GFR est non-AA >60 >60 ml/min/1.73m2    Calcium 9.4 8.5 - 10.1 MG/DL    Bilirubin, total 0.5 0.2 - 1.0 MG/DL    ALT (SGPT) 14 12 - 78 U/L    AST (SGOT) 10 (L) 15 - 37 U/L    Alk.  phosphatase 73 45 - 117 U/L    Protein, total 7.0 6.4 - 8.2 g/dL    Albumin 3.6 3.5 - 5.0 g/dL    Globulin 3.4 2.0 - 4.0 g/dL    A-G Ratio 1.1 1.1 - 2.2     ETHYL ALCOHOL    Collection Time: 03/26/18  9:11 PM   Result Value Ref Range    ALCOHOL(ETHYL),SERUM <10 <10 MG/DL   EKG, 12 LEAD, INITIAL    Collection Time: 03/26/18  9:14 PM   Result Value Ref Range    Ventricular Rate 105 BPM    Atrial Rate 105 BPM    P-R Interval 168 ms    QRS Duration 70 ms Q-T Interval 350 ms    QTC Calculation (Bezet) 462 ms    Calculated P Axis 15 degrees    Calculated R Axis -9 degrees    Calculated T Axis 10 degrees    Diagnosis       Sinus tachycardia with premature atrial complexes  Otherwise normal ECG  No previous ECGs available     GLUCOSE, POC    Collection Time: 03/26/18  9:38 PM   Result Value Ref Range    Glucose (POC) 123 (H) 65 - 100 mg/dL    Performed by Melissa Tinsley    URINALYSIS W/ REFLEX CULTURE    Collection Time: 03/26/18  9:41 PM   Result Value Ref Range    Color YELLOW/STRAW      Appearance CLEAR CLEAR      Specific gravity 1.020 1.003 - 1.030      pH (UA) 7.5 5.0 - 8.0      Protein TRACE (A) NEG mg/dL    Glucose NEGATIVE  NEG mg/dL    Ketone NEGATIVE  NEG mg/dL    Bilirubin NEGATIVE  NEG      Blood NEGATIVE  NEG      Urobilinogen 1.0 0.2 - 1.0 EU/dL    Nitrites NEGATIVE  NEG      Leukocyte Esterase TRACE (A) NEG      WBC 5-10 0 - 4 /hpf    RBC 0-5 0 - 5 /hpf    Epithelial cells FEW FEW /lpf    Bacteria NEGATIVE  NEG /hpf    UA:UC IF INDICATED URINE CULTURE ORDERED (A) CNI      Hyaline cast 0-2 0 - 5 /lpf   DRUG SCREEN, URINE    Collection Time: 03/26/18  9:41 PM   Result Value Ref Range    AMPHETAMINES NEGATIVE  NEG      BARBITURATES NEGATIVE  NEG      BENZODIAZEPINES NEGATIVE  NEG      COCAINE NEGATIVE  NEG      METHADONE NEGATIVE  NEG      OPIATES POSITIVE (A) NEG      PCP(PHENCYCLIDINE) NEGATIVE  NEG      THC (TH-CANNABINOL) NEGATIVE  NEG      Drug screen comment (NOTE)        Radiologic Studies -   XR CHEST PA LAT   Final Result      CT HEAD WO CONT   Final Result        CT Results  (Last 48 hours)               03/26/18 2159  CT HEAD WO CONT Final result    Impression:  IMPRESSION:       No acute intracranial normality       Narrative:  EXAM:  CT HEAD WO CONT       INDICATION:   Confusion/delirium, altered LOC, unexplained; ams       COMPARISON: None. CONTRAST:  None. TECHNIQUE: Unenhanced CT of the head was performed using 5 mm images.  Brain and   bone windows were generated. CT dose reduction was achieved through use of a   standardized protocol tailored for this examination and automatic exposure   control for dose modulation. FINDINGS:   The ventricles and sulci are normal in size, shape and configuration and   midline. There is no significant white matter disease. There is no intracranial   hemorrhage, extra-axial collection, mass, mass effect or midline shift. The   basilar cisterns are open. No acute infarct is identified. The bone windows   demonstrate no abnormalities. The visualized portions of the paranasal sinuses   and mastoid air cells are clear. CXR Results  (Last 48 hours)               03/26/18 2325  XR CHEST PA LAT Final result    Impression:  IMPRESSION:    Clear lungs. Narrative:  PA AND LATERAL CHEST RADIOGRAPHS: 3/26/2018 11:25 PM       INDICATION: Altered mental status. HISTORY (per electronic medical record): No additional relevant information   available. COMPARISON: None. TECHNIQUE: Frontal and lateral radiographs of the chest.       FINDINGS:   The lungs are clear. The central airways are patent. The heart size is normal.   No pneumothorax or pleural effusion. Medical Decision Making   I am the first provider for this patient. I reviewed the vital signs, available nursing notes, past medical history, past surgical history, family history and social history. Vital Signs-Reviewed the patient's vital signs.   Patient Vitals for the past 12 hrs:   Temp Pulse Resp BP SpO2   03/27/18 0230 - 97 - (!) 118/99 96 %   03/27/18 0200 - 96 - 114/73 96 %   03/26/18 2337 - (!) 104 - - 98 %   03/26/18 2330 - 94 - (!) 132/105 100 %   03/26/18 2326 - - - (!) 127/100 -   03/26/18 2300 - 93 - (!) 111/92 98 %   03/26/18 2230 - 96 - 100/82 98 %   03/26/18 2130 - (!) 103 - 107/77 98 %   03/26/18 2108 98.6 °F (37 °C) 99 16 - 100 %       Pulse Oximetry Analysis - 98% on RA    Cardiac Monitor:   Rate: 110 bpm  Rhythm: Sinus Tachycardia      Records Reviewed: Nursing Notes, Old Medical Records and Ambulance Run Sheet    Provider Notes (Medical Decision Making):     DDX:  Cva, ich, drug use, electrolyte abnormality    Plan:  Head ct, ua, uds, labs    Impression:  encephalopathy    ED Course:   Initial assessment performed. The patients presenting problems have been discussed, and they are in agreement with the care plan formulated and outlined with them. I have encouraged them to ask questions as they arise throughout their visit. I reviewed our electronic medical record system for any past medical records that were available that may contribute to the patients current condition, the nursing notes and and vital signs from today's visit    Nursing notes will be reviewed as they become available in realtime while the pt has been in the ED. Vijay Kendrick MD      EKG interpretation 2114: sinus tach, nl Axis, rate 105; , QRS 70, QTc 462; no acute ischemia; Vijay Kendrick MD    I personally reviewed pt's imaging. Official read by radiology listed below. Vijay Kendrick MD    PROGRESS NOTE:  2:30 AM  Pt reevaluated. Pt updated on resulted head CT and labs. Will attempt to contact pt's wife to see what the pt's baseline mental status is and more background on medical history. Written by Naomie Casarez ED Scribe, as dictated by Vijay Kendrick MD    PROGRESS NOTE:  3:29 AM  Contacted the pt's wife. She states the pt has been having issues with short term memory since January. She notes he was scheduled to have an appointment with psychiatry tomorrow for his persistent memory issues. Wife states she has been in [place] for the past week and has not talked to the pt since 3/25, noting when she did he was at his mental baseline.  She states he was in [place] getting his car serviced, because that is where he purchased his car, noted the dealership is about an hour away from where they live. Wife states that when she talked to the pt tonight (while in the hospital) he is still confused of where he is, which is not his baseline. She also notes the pt recently had a GLF at bank [place] weeks ago, where he hit his chin. She reports the fall was thought to be due to possible dehydration. Urged her to come to hospital in am to meet with pt's admitting doctors. Evangelina Thompson MD      CONSULT NOTE:   4:17 Kehinde Red MD spoke with Dr. Sedalia Gilford,   Specialty: Hospitalist  Discussed pt's hx, disposition, and available diagnostic and imaging results. Reviewed care plans. Consultant will evaluate pt for admission. Written by Tamar Crowder ED Scribe, as dictated by Evangelina Thompson MD.      Critical Care Time:     None    PLAN:  1. Admit to the hospital    Disposition:    Admit Note:  4:17 AM  Pt is being admitted by Dr. Sedalia Gilford. The results of their tests and reason(s) for their admission have been discussed with pt and/or available family. They convey agreement and understanding for the need to be admitted and for admission diagnosis. Diagnosis     Clinical Impression:   1. Disorientation    2. Encephalopathy acute        Attestations: This note is prepared by Tamar Crowder, acting as Scribe for MD Evangelina Mariscal MD : The scribe's documentation has been prepared under my direction and personally reviewed by me in its entirety. I confirm that the note above accurately reflects all work, treatment, procedures, and medical decision making performed by me. This note will not be viewable in [address]

## 2018-03-27 NOTE — CONSULTS
Consult  REFERRED BY:  Larry Riojas MD    CHIEF COMPLAINT: Memory loss      Subjective: Marium Lanza is a 63 y.o. right-handed  male we are asked to evaluate as a new patient to us for evaluation of a new problem of sudden confusion and getting lost while trying to drive home from the [facility] area yesterday. He apparently already has a known history of memory loss, and possible dementia, and his family doctor had referred him for neuropsych testing which he has not obtained yet. Both his physician and his wife have been complaining about his memory, but he does not think any other testing has been done. He is not on any medication for his memory, and has no family history of memory problems. He does have a history of high blood pressure and heart disease he says, because [place] and was diagnosed with a heart attack but also hit his head but had no residual from the head trauma. He denies any toxin exposure, denies any major depression as a cause of his memory loss, and initially said it has been present only for 1-2 months, but then says it may have been longer he just cannot remember. He apparently was buying a car in [facility] and was going to drive at home to [place] when this all happened. He denies any headache, any new focal weakness or sensory loss, denies any visual disturbance, and seems upset about getting lost.  He retired this year, but says he was not forced out of his job or had any difficulty with his work. He denies any history of alcohol or drug abuse, but his drug screen was positive for opiates. He says he does not drink or smoke. He has no other unusual history of head injury, toxin exposure, or other causes for his memory loss. He had no headache, no fever, no meningismus, no cardiac symptoms recently, no history of stroke, and he is  lives with his wife and is retired.     Past Medical History:   Diagnosis Date    Hypertension     Memory loss     Organic heart disease, NYHA class 1       History reviewed. No pertinent surgical history. Family History   Problem Relation Age of Onset    Hypertension Mother     Other Father      Old age      Social History   Substance Use Topics    Smoking status: Never Smoker    Smokeless tobacco: Never Used    Alcohol use Not on file         Current Facility-Administered Medications:     sodium chloride (NS) flush 5-10 mL, 5-10 mL, IntraVENous, Q8H, Karime Olivares MD    sodium chloride (NS) flush 5-10 mL, 5-10 mL, IntraVENous, PRN, Esutardo HICKS MD, 10 mL at 03/27/18 1329    acetaminophen (TYLENOL) tablet 650 mg, 650 mg, Oral, Q4H PRN, Karime Olivares MD    ondansetron (ZOFRAN) injection 4 mg, 4 mg, IntraVENous, Q4H PRN, Karime Olivares MD    bisacodyl (DULCOLAX) suppository 10 mg, 10 mg, Rectal, DAILY PRN, Karime Olivares MD    hydrALAZINE (APRESOLINE) 20 mg/mL injection 10 mg, 10 mg, IntraVENous, Q6H PRN, Estuardo HICKS MD        No Known Allergies     Review of Systems:  Review of systems not obtained due to patient factors. Vitals:    03/27/18 0752 03/27/18 1039 03/27/18 1040 03/27/18 1516   BP: 132/79 (!) 136/91 (!) 136/91 (!) 131/91   Pulse: 89 (!) 103 (!) 106 (!) 107   Resp: 17 18 18 18   Temp: 97.9 °F (36.6 °C) 98.6 °F (37 °C) 98.6 °F (37 °C) 98.1 °F (36.7 °C)   SpO2: 100% 100% 100% 100%   Weight:       Height:         Objective:     I      NEUROLOGICAL EXAM:    Appearance: The patient is well developed, well nourished, provides a poor history and is in no acute distress. Mental Status: Oriented to place and person, and not the date or the president,  Patient cannot do serial sevens well, cannot draw a clock that shows a time 10:50 completely, can remember 1 of 3 words only at 30 seconds with distraction, I cannot spell the word world backwards, cognitive function is abnormal and speech is fluent and no aphasia or dysarthria.  Mood and affect appropriate a little upset and depressed over his condition. Cranial Nerves:   Intact visual fields. Fundi are benign. GALILEA, EOM's full, no nystagmus, no ptosis. Facial sensation is normal. Corneal reflexes are not tested. Facial movement is symmetric. Hearing is normal bilaterally. Palate is midline with normal sternocleidomastoid and trapezius muscles are normal. Tongue is midline. Neck without meningismus or bruits  Temporal arteries are not tender or enlarged   Motor:  5/5 strength in upper and lower proximal and distal muscles. Normal bulk and tone. No fasciculations. Reflexes:   Deep tendon reflexes 2+/4 and symmetrical.  No babinski or clonus present   Sensory:   Normal to touch, pinprick and vibration and temperature. DSS is intact   Gait:  Normal gait. Tremor:   No tremor noted. Cerebellar:  No cerebellar signs present. Neurovascular:  Normal heart sounds and regular rhythm, peripheral pulses intact, and no carotid bruits. Assessment:       ICD-10-CM ICD-9-CM    1. Disorientation R41.0 780.99    2. Encephalopathy acute G93.40 348.30      Active Problems:    Encephalopathy (3/27/2018)      Dementia in Alzheimer's disease with early onset without behavioral disturbance (3/27/2018)      Altered mental status, unspecified (3/27/2018)      Bilateral carotid artery stenosis (3/27/2018)      TGA (transient global amnesia) (3/27/2018)        Plan:     Patient clearly has some memory issues, and certainly could be consistent with a presenile dementia of the Alzheimer's type or other presenile dementias, and I do not think the opiates will be causing this issue by the way he is acting  We will discuss with his wife when she comes in, and in the meantime will order MRI of the brain, carotid Dopplers EEG, thyroid P88 folic acid levels, sed rate and JUDE to rule out other causes of his memory problems.   Somewhat peculiar case, he does seem pretty impaired at this time with his memory issues and there may be some other additive factors we have to identify  We will also check carotids and an EEG to make sure there is no possible seizure or postictal component, or acute vascular problem. We will follow carefully with you, unusual case. Signed By: Gina Velasco MD     March 27, 2018       CC: Irasema Martinez MD  FAX: [phone]    This note will not be viewable in [address]

## 2018-03-27 NOTE — ED NOTES
Pt remains confused. Pt irritated by being hooked up to monitor and pulling off leads/BP cuff. Will continue to assess vital signs. Pt easily reoriented, but anxious in room. Pt remains pleasant, but appears agitated and is unable to remember why he is here. Reoriented pt, pt resting quietly, and this RN assisted pt with TV in attempt to help him relax. Will continue to monitor. Call bell within reach.

## 2018-03-27 NOTE — H&P
Hospitalist Admission Note    NAME: Mihai Smith   :  1955   MRN:  [id number]     Date/Time:  3/27/2018 8:40 AM    Patient PCP: Slime Hampton MD  ______________________________________________________________________  Given the patient's current clinical presentation, I have a high level of concern for decompensation if discharged from the emergency department. Complex decision making was performed, which includes reviewing the patient's available past medical records, laboratory results, and x-ray films. My assessment of this patient's clinical condition and my plan of care is as follows. Assessment / Plan:  Acute encephalopathy with underlying short term memory loss vs early dementia  -head CT and CXR neg for acute abnormalities  -UA not consistent with UTI  -UDS positive for opiods. Alcohol level negative. Lytes nl  -pt with long hx of short term memory loss per pt and family. Pt is scheduled to see neuropsych in his home town  -will check TSH, B12. His full work up should include neurosyphilis. -will ask in house neurologist to see pt  -family notify of pt's admission. Arranging to provide transportation if pt is being discharged.  -pharm consulted to for med rec. Pt does not recall his medics  -discussed with CM to help with dispo    Pt's wife- notified of pt's admission. Code Status: Full   Surrogate Decision Maker: pt's wife/son  DVT Prophylaxis: pt is ambulatory  GI Prophylaxis: not indicated      Subjective:   CHIEF COMPLAINT: confusion    HISTORY OF PRESENT ILLNESS:     Mihai Smith is a 63 y.o.  male present to the ED, does not recall his pmhx or medications he currently on . He states he took all his meds up until yesterday. Pt present to the ED for evaluation of confusion after being found by the police. As per pt and chart review. Pt is from [place], [place], driving to [place] to  his car and ended up in [place].   Pt is unclear as to how he ended up in [place]. He appears quite anxious during my encounter. He states it's 2015 and knows the president. Pt states he has had ongoing memory problems and is due to neuropsych evaluation in his hometown. Pt denies any chest pain, sob, palpitations, n/v/d. In the ER, vitals; T98.6, P99, /77  Labs: WBC 5.6, Hgb 11.4, , Na 137, K 4.2, Cr 1.23, UA with trace LE, however pt is asymptomatic. Head CT neg. We were asked to admit for work up and evaluation of the above problems. History reviewed. Pt cannot recall his PMHx  History reviewed. No pertinent surgical history. Social History   Substance Use Topics    Smoking status: Never Smoker    Smokeless tobacco: Never Used    Alcohol use Not on file        Family History   Problem Relation Age of Onset    No Known Problems Mother     No Known Problems Father      No Known Allergies     Prior to Admission medications    Not on File       REVIEW OF SYSTEMS:     I am not able to complete the review of systems because:    The patient is intubated and sedated    The patient has altered mental status due to his acute medical problems    The patient has baseline aphasia from prior stroke(s)    The patient has baseline dementia and is not reliable historian    The patient is in acute medical distress and unable to provide information           Total of 12 systems reviewed as follows:       POSITIVE= BOLD text  Negative = text not BOLD  General:  fever, chills, sweats, generalized weakness, weight loss/gain,      loss of appetite   Eyes:    blurred vision, eye pain, loss of vision, double vision  ENT:    rhinorrhea, pharyngitis   Respiratory:   cough, sputum production, SOB, CASTRO, wheezing, pleuritic pain   Cardiology:   chest pain, palpitations, orthopnea, PND, edema, syncope   Gastrointestinal:  abdominal pain , N/V, diarrhea, dysphagia, constipation, bleeding   Genitourinary:  frequency, urgency, dysuria, hematuria, incontinence   Muskuloskeletal :  arthralgia, myalgia, back pain  Hematology:  easy bruising, nose or gum bleeding, lymphadenopathy   Dermatological: rash, ulceration, pruritis, color change / jaundice  Endocrine:   hot flashes or polydipsia   Neurological:  headache, dizziness, confusion, focal weakness, paresthesia,     Speech difficulties, memory loss, gait difficulty  Psychological: Feelings of anxiety, depression, agitation    Objective:   VITALS:    Visit Vitals    /79 (BP 1 Location: Right arm, BP Patient Position: At rest)    Pulse 89    Temp 97.9 °F (36.6 °C)    Resp 17    Ht 5' 10\" (1.778 m)    Wt 81.6 kg (180 lb)    SpO2 100%    BMI 25.83 kg/m2       PHYSICAL EXAM:    General:    Alert, cooperative, no distress, appears stated age. HEENT: Atraumatic, anicteric sclerae, pink conjunctivae     No oral ulcers, mucosa moist, throat clear  Neck:  Supple, symmetrical,  thyroid: non tender  Lungs:   CTA b/l. No wheezing or Rhonchi. No rales. Chest wall:  No tenderness. No accessory muscle use. Heart:   Regular  rhythm,  No  Murmur. No edema  Abdomen:   Soft, NT. ND  BS+  Extremities: No cyanosis. No clubbing,      Skin turgor normal, Radial dial pulse 2+  Skin:     Not pale. Not Jaundiced  No rashes   Psych:  Not anxious or agitated. Neurologic: Disoriented. Awake and alert. He knows his location but does not know the year.  Moving all exts    _______________________________________________________________________  Care Plan discussed with:    Comments   Patient x    Family  x    RN x    Care Manager                    Consultant:  x ED physician   _______________________________________________________________________  Expected  Disposition:   Home with Family    HH/PT/OT/RN x   SNF/LTC    SHELLIE    ________________________________________________________________________  TOTAL TIME:  72 Minutes    Critical Care Provided     Minutes non procedure based      Comments    x Reviewed previous records   >50% of visit spent in counseling and coordination of care x Discussion with patient and/or family and questions answered       ________________________________________________________________________  Signed: Gail Jenkins MD    Procedures: see electronic medical records for all procedures/Xrays and details which were not copied into this note but were reviewed prior to creation of Plan. LAB DATA REVIEWED:    Recent Results (from the past 24 hour(s))   CBC WITH AUTOMATED DIFF    Collection Time: 03/26/18  9:11 PM   Result Value Ref Range    WBC 5.6 4.1 - 11.1 K/uL    RBC 3.37 (L) 4.10 - 5.70 M/uL    HGB 11.4 (L) 12.1 - 17.0 g/dL    HCT 33.7 (L) 36.6 - 50.3 %    .0 (H) 80.0 - 99.0 FL    MCH 33.8 26.0 - 34.0 PG    MCHC 33.8 30.0 - 36.5 g/dL    RDW 12.7 11.5 - 14.5 %    PLATELET 940 831 - 158 K/uL    MPV 10.2 8.9 - 12.9 FL    NRBC 0.0 0  WBC    ABSOLUTE NRBC 0.00 0.00 - 0.01 K/uL    NEUTROPHILS 79 (H) 32 - 75 %    LYMPHOCYTES 11 (L) 12 - 49 %    MONOCYTES 10 5 - 13 %    EOSINOPHILS 0 0 - 7 %    BASOPHILS 0 0 - 1 %    IMMATURE GRANULOCYTES 0 0.0 - 0.5 %    ABS. NEUTROPHILS 4.4 1.8 - 8.0 K/UL    ABS. LYMPHOCYTES 0.6 (L) 0.8 - 3.5 K/UL    ABS. MONOCYTES 0.6 0.0 - 1.0 K/UL    ABS. EOSINOPHILS 0.0 0.0 - 0.4 K/UL    ABS. BASOPHILS 0.0 0.0 - 0.1 K/UL    ABS. IMM.  GRANS. 0.0 0.00 - 0.04 K/UL    DF AUTOMATED      RBC COMMENTS NORMOCYTIC, NORMOCHROMIC     METABOLIC PANEL, COMPREHENSIVE    Collection Time: 03/26/18  9:11 PM   Result Value Ref Range    Sodium 137 136 - 145 mmol/L    Potassium 4.2 3.5 - 5.1 mmol/L    Chloride 101 97 - 108 mmol/L    CO2 26 21 - 32 mmol/L    Anion gap 10 5 - 15 mmol/L    Glucose 117 (H) 65 - 100 mg/dL    BUN 20 6 - 20 MG/DL    Creatinine 1.23 0.70 - 1.30 MG/DL    BUN/Creatinine ratio 16 12 - 20      GFR est AA >60 >60 ml/min/1.73m2    GFR est non-AA >60 >60 ml/min/1.73m2    Calcium 9.4 8.5 - 10.1 MG/DL    Bilirubin, total 0.5 0.2 - 1.0 MG/DL    ALT (SGPT) 14 12 - 78 U/L    AST (SGOT) 10 (L) 15 - 37 U/L    Alk.  phosphatase 73 45 - 117 U/L    Protein, total 7.0 6.4 - 8.2 g/dL    Albumin 3.6 3.5 - 5.0 g/dL    Globulin 3.4 2.0 - 4.0 g/dL    A-G Ratio 1.1 1.1 - 2.2     ETHYL ALCOHOL    Collection Time: 03/26/18  9:11 PM   Result Value Ref Range    ALCOHOL(ETHYL),SERUM <10 <10 MG/DL   EKG, 12 LEAD, INITIAL    Collection Time: 03/26/18  9:14 PM   Result Value Ref Range    Ventricular Rate 105 BPM    Atrial Rate 105 BPM    P-R Interval 168 ms    QRS Duration 70 ms    Q-T Interval 350 ms    QTC Calculation (Bezet) 462 ms    Calculated P Axis 15 degrees    Calculated R Axis -9 degrees    Calculated T Axis 10 degrees    Diagnosis       Sinus tachycardia with premature atrial complexes  Otherwise normal ECG  No previous ECGs available     GLUCOSE, POC    Collection Time: 03/26/18  9:38 PM   Result Value Ref Range    Glucose (POC) 123 (H) 65 - 100 mg/dL    Performed by [facility]    URINALYSIS W/ REFLEX CULTURE    Collection Time: 03/26/18  9:41 PM   Result Value Ref Range    Color YELLOW/STRAW      Appearance CLEAR CLEAR      Specific gravity 1.020 1.003 - 1.030      pH (UA) 7.5 5.0 - 8.0      Protein TRACE (A) NEG mg/dL    Glucose NEGATIVE  NEG mg/dL    Ketone NEGATIVE  NEG mg/dL    Bilirubin NEGATIVE  NEG      Blood NEGATIVE  NEG      Urobilinogen 1.0 0.2 - 1.0 EU/dL    Nitrites NEGATIVE  NEG      Leukocyte Esterase TRACE (A) NEG      WBC 5-10 0 - 4 /hpf    RBC 0-5 0 - 5 /hpf    Epithelial cells FEW FEW /lpf    Bacteria NEGATIVE  NEG /hpf    UA:UC IF INDICATED URINE CULTURE ORDERED (A) CNI      Hyaline cast 0-2 0 - 5 /lpf   DRUG SCREEN, URINE    Collection Time: 03/26/18  9:41 PM   Result Value Ref Range    AMPHETAMINES NEGATIVE  NEG      BARBITURATES NEGATIVE  NEG      BENZODIAZEPINES NEGATIVE  NEG      COCAINE NEGATIVE  NEG      METHADONE NEGATIVE  NEG      OPIATES POSITIVE (A) NEG      PCP(PHENCYCLIDINE) NEGATIVE  NEG      THC (TH-CANNABINOL) NEGATIVE  NEG      Drug screen comment (NOTE)

## 2018-03-27 NOTE — PROGRESS NOTES
Pt is a 63 y.o  male admitted with Encephalopathy. Pt was alert, sitting in the chair in no distress. Demographic information verified and all is correct. Pt lives with his wife in a 2 story home with steps. Prior to admission, pt was independent with his ADL's and IADL's and was driving. Pt verbalized he has been having some issues with dementia and also verbalized he had a heart attack 2 yrs ago after vacation. Pt has a nebulizer and uses 4 different medications for his asthma. Denies having home health and rehab in the past. CM called pt's wife and left a message. Received call earlier from Dr. Amy Newby that pt will be discharging later today. CM will continue to follow pt for discharge planning needs. Care Management Interventions  PCP Verified by CM: Yes (Dr. Henri Sadnoval)  Mode of Transport at Discharge:  Other (see comment) (pt's wife will transport by car)  Transition of Care Consult (CM Consult): Discharge Planning  Discharge Durable Medical Equipment: No (nebulizer)  Physical Therapy Consult: No  Occupational Therapy Consult: No  Speech Therapy Consult: No  Current Support Network: Lives with Spouse, Own Home (lives with wife in a 2 story home with steps)  Confirm Follow Up Transport: Family  Discharge Location  Discharge Placement: [facility] [address], [address] [facility]

## 2018-03-27 NOTE — ED TRIAGE NOTES
Pt arrived via EMS. Pt is from [place], [place] and went to [place] but came toward [place] instead of going back to [facility]. Pt pulled over to weight station and they called EMS because pt seemed confused. Per EMS, pt's wife states pt is going to follow up with neuro for possible brain tumor or dementia. Per EMS, pt has history of confusion. Wife is currently in [place]. Will continue to monitor. Call bell within reach.

## 2018-03-27 NOTE — ED NOTES
Pt has clear speech, but has inappropriate responses. When asked \"what year is it?\", pt responded 1998. When asked \"what month is it?\", pt responded \"2018\". Pt stated he was [employer] [place]". When asked what type of building, he responded \"[employer] property\". MD at bedside. Will continue to monitor.

## 2018-03-28 ENCOUNTER — APPOINTMENT (OUTPATIENT)
Dept: MRI IMAGING | Age: 63
DRG: 056 | End: 2018-03-28
Attending: PSYCHIATRY & NEUROLOGY
Payer: COMMERCIAL

## 2018-03-28 VITALS
WEIGHT: 180 LBS | OXYGEN SATURATION: 100 % | SYSTOLIC BLOOD PRESSURE: 111 MMHG | DIASTOLIC BLOOD PRESSURE: 92 MMHG | BODY MASS INDEX: 25.77 KG/M2 | RESPIRATION RATE: 18 BRPM | TEMPERATURE: 98.7 F | HEART RATE: 100 BPM | HEIGHT: 70 IN

## 2018-03-28 PROBLEM — R56.9 CONVULSION (HCC): Status: ACTIVE | Noted: 2018-03-28

## 2018-03-28 LAB
ANION GAP SERPL CALC-SCNC: 4 MMOL/L (ref 5–15)
BACTERIA SPEC CULT: NORMAL
BASOPHILS # BLD: 0 K/UL (ref 0–0.1)
BASOPHILS NFR BLD: 1 % (ref 0–1)
BUN SERPL-MCNC: 22 MG/DL (ref 6–20)
BUN/CREAT SERPL: 19 (ref 12–20)
CALCIUM SERPL-MCNC: 9 MG/DL (ref 8.5–10.1)
CC UR VC: NORMAL
CHLORIDE SERPL-SCNC: 103 MMOL/L (ref 97–108)
CO2 SERPL-SCNC: 30 MMOL/L (ref 21–32)
CREAT SERPL-MCNC: 1.15 MG/DL (ref 0.7–1.3)
DIFFERENTIAL METHOD BLD: ABNORMAL
EOSINOPHIL # BLD: 0.1 K/UL (ref 0–0.4)
EOSINOPHIL NFR BLD: 2 % (ref 0–7)
ERYTHROCYTE [DISTWIDTH] IN BLOOD BY AUTOMATED COUNT: 12.9 % (ref 11.5–14.5)
ERYTHROCYTE [SEDIMENTATION RATE] IN BLOOD: 16 MM/HR (ref 0–20)
FOLATE SERPL-MCNC: 19 NG/ML (ref 5–21)
GLUCOSE SERPL-MCNC: 110 MG/DL (ref 65–100)
HCT VFR BLD AUTO: 32.8 % (ref 36.6–50.3)
HGB BLD-MCNC: 11.1 G/DL (ref 12.1–17)
IMM GRANULOCYTES # BLD: 0 K/UL (ref 0–0.04)
IMM GRANULOCYTES NFR BLD AUTO: 0 % (ref 0–0.5)
LYMPHOCYTES # BLD: 1.1 K/UL (ref 0.8–3.5)
LYMPHOCYTES NFR BLD: 20 % (ref 12–49)
MCH RBC QN AUTO: 33.8 PG (ref 26–34)
MCHC RBC AUTO-ENTMCNC: 33.8 G/DL (ref 30–36.5)
MCV RBC AUTO: 100 FL (ref 80–99)
MONOCYTES # BLD: 0.6 K/UL (ref 0–1)
MONOCYTES NFR BLD: 11 % (ref 5–13)
NEUTS SEG # BLD: 3.8 K/UL (ref 1.8–8)
NEUTS SEG NFR BLD: 67 % (ref 32–75)
NRBC # BLD: 0 K/UL (ref 0–0.01)
NRBC BLD-RTO: 0 PER 100 WBC
PLATELET # BLD AUTO: 184 K/UL (ref 150–400)
PMV BLD AUTO: 10.2 FL (ref 8.9–12.9)
POTASSIUM SERPL-SCNC: 4.4 MMOL/L (ref 3.5–5.1)
RBC # BLD AUTO: 3.28 M/UL (ref 4.1–5.7)
SERVICE CMNT-IMP: NORMAL
SODIUM SERPL-SCNC: 137 MMOL/L (ref 136–145)
WBC # BLD AUTO: 5.7 K/UL (ref 4.1–11.1)

## 2018-03-28 PROCEDURE — 83520 IMMUNOASSAY QUANT NOS NONAB: CPT | Performed by: PSYCHIATRY & NEUROLOGY

## 2018-03-28 PROCEDURE — 74011250637 HC RX REV CODE- 250/637: Performed by: STUDENT IN AN ORGANIZED HEALTH CARE EDUCATION/TRAINING PROGRAM

## 2018-03-28 PROCEDURE — 36415 COLL VENOUS BLD VENIPUNCTURE: CPT | Performed by: PSYCHIATRY & NEUROLOGY

## 2018-03-28 PROCEDURE — 70544 MR ANGIOGRAPHY HEAD W/O DYE: CPT

## 2018-03-28 PROCEDURE — 82746 ASSAY OF FOLIC ACID SERUM: CPT | Performed by: PSYCHIATRY & NEUROLOGY

## 2018-03-28 PROCEDURE — 94640 AIRWAY INHALATION TREATMENT: CPT

## 2018-03-28 PROCEDURE — 82306 VITAMIN D 25 HYDROXY: CPT | Performed by: PSYCHIATRY & NEUROLOGY

## 2018-03-28 PROCEDURE — 86038 ANTINUCLEAR ANTIBODIES: CPT | Performed by: PSYCHIATRY & NEUROLOGY

## 2018-03-28 PROCEDURE — 74011250637 HC RX REV CODE- 250/637: Performed by: PSYCHIATRY & NEUROLOGY

## 2018-03-28 PROCEDURE — 85025 COMPLETE CBC W/AUTO DIFF WBC: CPT | Performed by: INTERNAL MEDICINE

## 2018-03-28 PROCEDURE — 80048 BASIC METABOLIC PNL TOTAL CA: CPT | Performed by: INTERNAL MEDICINE

## 2018-03-28 PROCEDURE — 74011000250 HC RX REV CODE- 250: Performed by: GENERAL ACUTE CARE HOSPITAL

## 2018-03-28 PROCEDURE — 93880 EXTRACRANIAL BILAT STUDY: CPT

## 2018-03-28 PROCEDURE — 85652 RBC SED RATE AUTOMATED: CPT | Performed by: PSYCHIATRY & NEUROLOGY

## 2018-03-28 RX ORDER — DONEPEZIL HYDROCHLORIDE 5 MG/1
5 TABLET, FILM COATED ORAL
Status: DISCONTINUED | OUTPATIENT
Start: 2018-03-28 | End: 2018-03-28 | Stop reason: HOSPADM

## 2018-03-28 RX ORDER — DONEPEZIL HYDROCHLORIDE 5 MG/1
5 TABLET, FILM COATED ORAL
Qty: 30 TAB | Refills: 1 | Status: SHIPPED | OUTPATIENT
Start: 2018-03-28 | End: 2018-04-27

## 2018-03-28 RX ORDER — DIAZEPAM 5 MG/1
10 TABLET ORAL
Status: COMPLETED | OUTPATIENT
Start: 2018-03-28 | End: 2018-03-28

## 2018-03-28 RX ADMIN — IPRATROPIUM BROMIDE AND ALBUTEROL SULFATE 3 ML: .5; 3 SOLUTION RESPIRATORY (INHALATION) at 12:21

## 2018-03-28 RX ADMIN — DONEPEZIL HYDROCHLORIDE 5 MG: 5 TABLET, FILM COATED ORAL at 18:15

## 2018-03-28 RX ADMIN — IPRATROPIUM BROMIDE AND ALBUTEROL SULFATE 3 ML: .5; 3 SOLUTION RESPIRATORY (INHALATION) at 08:41

## 2018-03-28 RX ADMIN — Medication 10 ML: at 15:15

## 2018-03-28 RX ADMIN — Medication 10 ML: at 06:00

## 2018-03-28 RX ADMIN — DIAZEPAM 10 MG: 5 TABLET ORAL at 10:07

## 2018-03-28 NOTE — PROCEDURES
[facility]  *** FINAL REPORT ***    Name: Mirza Palacios  MRN: [id number]    Inpatient  : 10 Oct 1955  HIS Order #: [id number]  [address] [facility] Visit #: [id number]  Date: 28 Mar 2018    TYPE OF TEST: Cerebrovascular Duplex    REASON FOR TEST  TIA    Right Carotid:-             Proximal               Mid                 Distal  cm/s  Systolic  Diastolic  Systolic  Diastolic  Systolic  Diastolic  CCA:     26.5      14.0                           105.0      24.0  Bulb:  ECA:     53.0       6.0  ICA:     45.0      12.0       64.0      17.0       51.0       9.0  ICA/CCA:  0.4       0.5    ICA Stenosis: <50%    Right Vertebral:-  Finding: Antegrade  Sys:       52.0  Christine:       19.0    Right Subclavian: Normal    Left Carotid:-            Proximal                Mid                 Distal  cm/s  Systolic  Diastolic  Systolic  Diastolic  Systolic  Diastolic  CCA:     14.6      20.0                           103.0      26.0  Bulb:  ECA:     69.0      11.0  ICA:     61.0      14.0       50.0      11.0       38.0      12.0  ICA/CCA:  0.6       0.5    ICA Stenosis: <50%    Left Vertebral:-  Finding: Antegrade  Sys:       56.0  Christine:       20.0    Left Subclavian: Normal    INTERPRETATION/FINDINGS  PROCEDURE:  Carotid Duplex Examination using B-mode, color and  spectral Doppler of the extracranial cerebrovascular arteries. 1. Bilateral <50% stenosis of the internal carotid arteries. 2. No significant stenosis in the external carotid arteries  bilaterally. 3. Antegrade flow in both vertebral arteries. 4. Normal flow in both subclavian arteries. Plaque Morphology:  1. Heterogeneous plaque in the right ICA. 2. Heterogeneous plaque in the left ICA. ADDITIONAL COMMENTS    I have personally reviewed the data relevant to the interpretation of  this  study. TECHNOLOGIST: Galindo Valderrama. SHANKAR Goldstein, RDMS  Signed: 2018 09:09 AM    PHYSICIAN: Afsaneh Floyd MD  Signed: 2018 05:28 PM

## 2018-03-28 NOTE — PROGRESS NOTES
[address] [facility]  Preliminary Report:  Carotid Duplex Scan    Right:  Minimal plaque noted in the right carotid system. Right ICA velocities suggest less than 50% diameter reduction. Right vertebral artery flow is antegrade. Left:  Minimal plaque noted in the left carotid system. Left ICA velocities suggest less than 50% diameter reduction. Left vertebral artery flow is antegrade. Final report to follow.

## 2018-03-28 NOTE — ROUTINE PROCESS
Discharge Instructions reviewed with patient and spouse at this time. Opportunity for questions and clarification offered. No questions asked patient escorted to Main Entrance by nurse at this time.

## 2018-03-28 NOTE — PROGRESS NOTES
Bedside shift change report given to Jonna Peace RN (oncoming nurse) by Jose Carrion RN (offgoing nurse). Report included the following information SBAR, Kardex, Intake/Output and Recent Results.

## 2018-03-28 NOTE — DISCHARGE SUMMARY
Physician Discharge Summary     Pt Name  Liam Alfonso   Admit date:  3/26/2018   Discharge date and time:  3/28/2018  7:57 PM   Room Number  3821/57    Medical Record Number  [id number] @ [facility]   Age  63 y.o. Date of Birth 1955   PCP Claudetta Mariner, MD     Admission Diagnoses:                        Dementia in Alzheimer's disease with early onset without behavioral disturbance     Hospital Problems  Never Reviewed          Codes Class Noted POA    Convulsion ([facility] [address] ICD-10-CM: R56.9  ICD-9-CM: 780.39  3/28/2018 Unknown        Encephalopathy ICD-10-CM: G93.40  ICD-9-CM: 348.30  3/27/2018 Unknown        * (Principal)Dementia in Alzheimer's disease with early onset without behavioral disturbance ICD-10-CM: G30.0, F02.80  ICD-9-CM: 331.0, 294.10  3/27/2018 Unknown        Altered mental status, unspecified ICD-10-CM: R41.82  ICD-9-CM: 780.97  3/27/2018 Unknown        Bilateral carotid artery stenosis ICD-10-CM: I65.23  ICD-9-CM: 433.10, 433.30  3/27/2018 Unknown        TGA (transient global amnesia) ICD-10-CM: G45.4  ICD-9-CM: 437.7  3/27/2018 Unknown               No Known Allergies     Excerpt from HPI :phi Alfonso is a 63 y.o.  male present to the ED, does not recall his pmhx or medications he currently on . He states he took all his meds up until yesterday. Pt present to the ED for evaluation of confusion after being found by the police. As per pt and chart review. Pt is from [place], [place], driving to [place] to  his car and ended up in [place]. Pt is unclear as to how he ended up in [place]. He appears quite anxious during my encounter. He states it's 2015 and knows the president. Pt states he has had ongoing memory problems and is due to neuropsych evaluation in his hometown. Pt denies any chest pain, sob, palpitations, n/v/d.   In the ER, vitals; T98.6, P99, /77  Labs: WBC 5.6, Hgb 11.4, , Na 137, K 4.2, Cr 1.23, UA with trace LE, however pt is asymptomatic. Head CT neg. Hospital Course: This pt was admitted with  Dementia in Alzheimer's disease with early onset without behavioral disturbance on 3/26/2018. Dementia in Alzheimer's disease with early onset without behavioral disturbance   Acute encephalopathy with underlying short term memory loss vs early dementia  -MRI Brain neg for acute abnormalities  -seen by Neurology, started on Aricept 5mg daily, f/u witrh his neurologist  -Carotids normal   -UA not consistent with UTI  -UDS positive for opiods.  Alcohol level negative.  Lytes nl  -pt with long hx of short term memory loss per pt and family.  Pt is scheduled to see neuropsych in his home town  -B12 (301) and TSH (4.8)normal   -carotids <50% bilaterally, normal, by duplex  -Pt and wife counseled at length that he must avoid driving  -He does have memory loss and is impulsive, but wife says she is fine to manage him at home, and she hides the car keys.      Pt's wife- notified of pt's admission.        Code Status: Full   Surrogate Decision Maker: pt's wife/son     Condition at the time of discharge improved and stable .      Query:   Treatment team[de-identified] Treatment Team: Care Manager: Moira Dandy, RN; Consulting Provider: Jona Gloria MD; Utilization Review: An Engle       Other Pertinent data:   TODAY's CLINICAL FINDINGS:   Visit Vitals    BP (!) 111/92 (BP 1 Location: Right arm, BP Patient Position: Sitting)    Pulse 100    Temp 98.7 °F (37.1 °C)    Resp 18    Ht 5' 10\" (1.778 m)    Wt 81.6 kg (180 lb)    SpO2 100%    BMI 25.83 kg/m2      Wt Readings from Last 10 Encounters:   03/26/18 81.6 kg (180 lb)   03/27/18 81.6 kg (180 lb)       Physical Exam:    Gen:  Well-developed, well-nourished, in no acute distress  HEENT:  Pink conjunctivae, PERRL, hearing intact to voice, moist mucous membranes  Neck:  Supple, without masses, thyroid non-tender  Resp:  No accessory muscle use, clear breath sounds without wheezes rales or rhonchi  Card:  No murmurs, normal S1, S2 without thrills, bruits or peripheral edema  Abd:  Soft, non-tender, non-distended, normoactive bowel sounds are present, no palpable organomegaly  Lymph:  No cervical adenopathy  Musc:  No cyanosis or clubbing  Skin:  No rashes or ulcers, skin turgor is good  Neuro:  Cranial nerves 3-12 are grossly intact,  strength is 5/5 bilaterally, dorsi / plantarflexion strength is 5/5 bilaterally, follows commands appropriately  Psych:  Alert with good insight. Oriented to person, place, and time, but impulsive and having rapid change of mood       Disposition:Home. Diet: Regular Diet   Care Plan discussed with: Patient/Family   Follow up   Follow-up Information     Follow up With Details Comments Contact Info    Lupe Sheppard MD Go on 4/3/2018 Please follow up on April 3, 2018 at 12:30 [address] [facility]  [phone]           Discharge Medication List as of 3/28/2018  7:23 PM      START taking these medications    Details   donepezil (ARICEPT) 5 mg tablet Take 1 Tab by mouth nightly for 30 days. , Print, Disp-30 Tab, R-1         CONTINUE these medications which have NOT CHANGED    Details   gabapentin (NEURONTIN) 300 mg capsule Take 300 mg by mouth nightly., Historical Med      folic acid (FOLVITE) 1 mg tablet Take 1 mg by mouth daily. , Historical Med      albuterol (PROAIR HFA) 90 mcg/actuation inhaler Take 2 Puffs by inhalation every four (4) hours as needed for Wheezing., Historical Med      montelukast (SINGULAIR) 10 mg tablet Take 10 mg by mouth daily. , Historical Med      traZODone (DESYREL) 150 mg tablet Take 300 mg by mouth nightly., Historical Med      eszopiclone (LUNESTA) 3 mg tablet Take 3 mg by mouth nightly., Historical Med      allopurinol (ZYLOPRIM) 300 mg tablet Take 300 mg by mouth daily. , Historical Med      buprenorphine-naloxone (SUBOXONE) 8-2 mg film sublingaul film 1 Film by SubLINGual route two (2) times a day., Historical Med      lamoTRIgine (LAMICTAL) 25 mg tablet Take 25 mg by mouth daily. , Historical Med      spironolactone (ALDACTONE) 50 mg tablet Take 50 mg by mouth daily. , Historical Med      furosemide (LASIX) 20 mg tablet Take 20 mg by mouth daily. , Historical Med      amLODIPine (NORVASC) 5 mg tablet Take 5 mg by mouth daily. , Historical Med      lisinopril-hydroCHLOROthiazide (PRINZIDE, ZESTORETIC) 10-12.5 mg per tablet Take 1 Tab by mouth daily. , Historical Med      ARIPiprazole (ABILIFY) 5 mg tablet Take 5 mg by mouth daily. , Historical Med      DULoxetine (CYMBALTA) 60 mg capsule Take 60 mg by mouth daily. , Historical Med                Significant Diagnostic Studies:   Recent Labs      03/28/18 0523 03/26/18 2111   WBC  5.7  5.6   HGB  11.1*  11.4*   HCT  32.8*  33.7*   PLT  184  164     Recent Labs      03/28/18 0523 03/26/18 2111   NA  137  137   K  4.4  4.2   CL  103  101   CO2  30  26   BUN  22*  20   CREA  1.15  1.23   GLU  110*  117*   CA  9.0  9.4     Recent Labs      03/26/18 2111   SGOT  10*   ALT  14   AP  73   TBILI  0.5   TP  7.0   ALB  3.6   GLOB  3.4     No results for input(s): INR, PTP, APTT in the last 72 hours. No lab exists for component: INREXT, INREXT   No results for input(s): FE, TIBC, PSAT, FERR in the last 72 hours. No results for input(s): PH, PCO2, PO2 in the last 72 hours. No results for input(s): CPK, CKMB in the last 72 hours. No lab exists for component: TROPONINI  Lab Results   Component Value Date/Time    Glucose (POC) 123 (H) 03/26/2018 09:38 PM           ________________________________________________________________________    Celestine Lipoma Time for face to face meeting with the pt and examination including care coordination with staff and chart review (>50% of total time listed here )  approx.  40 min]    ________________________________________________________________________    Mariely Nicholas MD  3/28/2018

## 2018-03-28 NOTE — ROUTINE PROCESS
Per Dr Deitra Aschoff request, spoke with [facility] CHILDREN from MRI to have CD of pt's MRI made for Pt to take to his regular MD.    Disc being made and tubed.

## 2018-03-28 NOTE — PROGRESS NOTES
Spiritual Care Partner Volunteer visited patient in Ortho on 3/28/18. Documented by:  ELPIDIO Younger

## 2018-03-28 NOTE — PROGRESS NOTES
Patient requesting PTA meds Trazodone 300 mg and Cymbalta 120. Dr. Melida Wells called, stated that patient came in with altered mental status and that we would hold these medications today and see how patient fares. This information was relayed to the patient. Nursing will continue to monitor.

## 2018-03-28 NOTE — PROCEDURES
[facility]  EEG    Mary Jane Mccauley  MR#: [id number]  : 1955  ACCOUNT #: [de-identified]   DATE OF SERVICE: 2018    CLINICAL INDICATION:  The patient is a 80-year-old male with a history of memory loss. EEG to rule out seizures, rule out cortical abnormality. Rule out encephalopathy. Patient with sudden confusion. Rule out partial seizures. EEG CLASSIFICATION:  The patient has dysrhythmia grade I, generalized. DESCRIPTION OF THE RECORD:  The background of this recording contains a posteriorly-located occipital alpha rhythm of 8-9 Hz that attenuates some with eye opening. Throughout the recording, there was a mild increase in some generalized 2-6 Hz activity seen without clear spike or spike and wave discharges or focal slowing seen. Photic stimulation produced a mild driving response in the posterior head regions. Hyperventilation was not performed. The patient did enter brief stage of sleep with K complexes and sleep spindles seen in the central head regions. INTERPRETATION:  This is a mildly abnormal electroencephalogram due to the generalized slow wave activity seen most consistent with a diffuse encephalopathy of toxic-metabolic or degenerative type. No clear focal process or spike discharges were seen. Clinical correlation recommended.       MD REGINA Lino / [place]  D: 2018 16:11     T: 2018 17:23  JOB #: [id number]  CC: Kd Alcala MD

## 2018-03-29 LAB
ANA SER QL: NEGATIVE
SEE BELOW:, 164879: NORMAL

## 2018-03-29 NOTE — PROGRESS NOTES
Neurology Progress Note    Patient ID:  Hayley Choudhury  [id number]  24 y.o.  1955    Subjective:      Patient has complaints memory loss and getting lost while driving and probable senile dementia of the Alzheimer's type. Patient's workup today shows an MRI of the brain it was relatively unremarkable except for atrophy, and his EEG is mild generalized flow, and his carotid Doppler showed no significant stenosis. Patient already has neuropsych testing ordered by his PCP, and his wife will pick him up today and go home. Because of his clear memory loss, we will start him on 5 mg of Aricept and his PCP can follow him at home. His metabolic factors including B12 and thyroid are all normal also. Patient remained stable without any new neurological symptoms, and agrees to taking his medication. Current Facility-Administered Medications   Medication Dose Route Frequency    donepezil (ARICEPT) tablet 5 mg  5 mg Oral PCD    sodium chloride (NS) flush 5-10 mL  5-10 mL IntraVENous Q8H    sodium chloride (NS) flush 5-10 mL  5-10 mL IntraVENous PRN    acetaminophen (TYLENOL) tablet 650 mg  650 mg Oral Q4H PRN    ondansetron (ZOFRAN) injection 4 mg  4 mg IntraVENous Q4H PRN    bisacodyl (DULCOLAX) suppository 10 mg  10 mg Rectal DAILY PRN    hydrALAZINE (APRESOLINE) 20 mg/mL injection 10 mg  10 mg IntraVENous Q6H PRN    albuterol-ipratropium (DUO-NEB) 2.5 MG-0.5 MG/3 ML  3 mL Nebulization Q4H RT     Current Outpatient Prescriptions   Medication Sig    donepezil (ARICEPT) 5 mg tablet Take 1 Tab by mouth nightly for 30 days.  gabapentin (NEURONTIN) 300 mg capsule Take 300 mg by mouth nightly.  folic acid (FOLVITE) 1 mg tablet Take 1 mg by mouth daily.  albuterol (PROAIR HFA) 90 mcg/actuation inhaler Take 2 Puffs by inhalation every four (4) hours as needed for Wheezing.  montelukast (SINGULAIR) 10 mg tablet Take 10 mg by mouth daily.     traZODone (DESYREL) 150 mg tablet Take 300 mg by mouth nightly.  eszopiclone (LUNESTA) 3 mg tablet Take 3 mg by mouth nightly.  allopurinol (ZYLOPRIM) 300 mg tablet Take 300 mg by mouth daily.  buprenorphine-naloxone (SUBOXONE) 8-2 mg film sublingaul film 1 Film by SubLINGual route two (2) times a day.  lamoTRIgine (LAMICTAL) 25 mg tablet Take 25 mg by mouth daily.  spironolactone (ALDACTONE) 50 mg tablet Take 50 mg by mouth daily.  furosemide (LASIX) 20 mg tablet Take 20 mg by mouth daily.  amLODIPine (NORVASC) 5 mg tablet Take 5 mg by mouth daily.  lisinopril-hydroCHLOROthiazide (PRINZIDE, ZESTORETIC) 10-12.5 mg per tablet Take 1 Tab by mouth daily.  ARIPiprazole (ABILIFY) 5 mg tablet Take 5 mg by mouth daily.  DULoxetine (CYMBALTA) 60 mg capsule Take 60 mg by mouth daily.         Review of Systems:    A comprehensive review of systems was negative except for: Neurological: positive for memory problems    Objective:     Patient Vitals for the past 8 hrs:   BP Temp Pulse Resp SpO2   03/28/18 1524 (!) 111/92 98.7 °F (37.1 °C) 100 18 100 %               Lab Review   Recent Results (from the past 24 hour(s))   METABOLIC PANEL, BASIC    Collection Time: 03/28/18  5:23 AM   Result Value Ref Range    Sodium 137 136 - 145 mmol/L    Potassium 4.4 3.5 - 5.1 mmol/L    Chloride 103 97 - 108 mmol/L    CO2 30 21 - 32 mmol/L    Anion gap 4 (L) 5 - 15 mmol/L    Glucose 110 (H) 65 - 100 mg/dL    BUN 22 (H) 6 - 20 MG/DL    Creatinine 1.15 0.70 - 1.30 MG/DL    BUN/Creatinine ratio 19 12 - 20      GFR est AA >60 >60 ml/min/1.73m2    GFR est non-AA >60 >60 ml/min/1.73m2    Calcium 9.0 8.5 - 10.1 MG/DL   CBC WITH AUTOMATED DIFF    Collection Time: 03/28/18  5:23 AM   Result Value Ref Range    WBC 5.7 4.1 - 11.1 K/uL    RBC 3.28 (L) 4.10 - 5.70 M/uL    HGB 11.1 (L) 12.1 - 17.0 g/dL    HCT 32.8 (L) 36.6 - 50.3 %    .0 (H) 80.0 - 99.0 FL    MCH 33.8 26.0 - 34.0 PG    MCHC 33.8 30.0 - 36.5 g/dL    RDW 12.9 11.5 - 14.5 %    PLATELET 477 844 - 270 K/uL    MPV 10.2 8.9 - 12.9 FL    NRBC 0.0 0  WBC    ABSOLUTE NRBC 0.00 0.00 - 0.01 K/uL    NEUTROPHILS 67 32 - 75 %    LYMPHOCYTES 20 12 - 49 %    MONOCYTES 11 5 - 13 %    EOSINOPHILS 2 0 - 7 %    BASOPHILS 1 0 - 1 %    IMMATURE GRANULOCYTES 0 0.0 - 0.5 %    ABS. NEUTROPHILS 3.8 1.8 - 8.0 K/UL    ABS. LYMPHOCYTES 1.1 0.8 - 3.5 K/UL    ABS. MONOCYTES 0.6 0.0 - 1.0 K/UL    ABS. EOSINOPHILS 0.1 0.0 - 0.4 K/UL    ABS. BASOPHILS 0.0 0.0 - 0.1 K/UL    ABS. IMM. GRANS. 0.0 0.00 - 0.04 K/UL    DF AUTOMATED     SED RATE (ESR)    Collection Time: 03/28/18  5:23 AM   Result Value Ref Range    Sed rate, automated 16 0 - 20 mm/hr   FOLATE    Collection Time: 03/28/18  5:23 AM   Result Value Ref Range    Folate 19.0 5.0 - 21.0 ng/mL       Additional comments:I reviewed the patients new imaging test results. EEG, MRI, and carotid Dopplers all reviewed personally by myself today    NEUROLOGICAL EXAM:     Appearance: The patient is well developed, well nourished, provides a poor history and is in no acute distress. Mental Status: Oriented to place and person, and not the date or the president,  Patient cannot do serial sevens well, cannot draw a clock that shows a time 10:50 completely, can remember 1 of 3 words only at 30 seconds with distraction, I cannot spell the word world backwards, cognitive function is abnormal and speech is fluent and no aphasia or dysarthria. Mood and affect appropriate a little upset and depressed over his condition. Cranial Nerves:   Intact visual fields. Fundi are benign. GALILEA, EOM's full, no nystagmus, no ptosis. Facial sensation is normal. Corneal reflexes are not tested. Facial movement is symmetric. Hearing is normal bilaterally. Palate is midline with normal sternocleidomastoid and trapezius muscles are normal. Tongue is midline. Neck without meningismus or bruits  Temporal arteries are not tender or enlarged   Motor:  5/5 strength in upper and lower proximal and distal muscles.  Normal bulk and tone. No fasciculations. Reflexes:   Deep tendon reflexes 2+/4 and symmetrical.  No babinski or clonus present   Sensory:   Normal to touch, pinprick and vibration and temperature. DSS is intact   Gait:  Normal gait. Tremor:   No tremor noted. Cerebellar:  No cerebellar signs present. Neurovascular:  Normal heart sounds and regular rhythm, peripheral pulses intact, and no carotid bruits.            Assessment:     Active Problems:    Encephalopathy (3/27/2018)      Dementia in Alzheimer's disease with early onset without behavioral disturbance (3/27/2018)      Altered mental status, unspecified (3/27/2018)      Bilateral carotid artery stenosis (3/27/2018)      TGA (transient global amnesia) (3/27/2018)        Plan:     Patient has complaints memory loss and getting lost while driving and probable senile dementia of the Alzheimer's type. Patient's workup today shows an MRI of the brain it was relatively unremarkable except for atrophy, and his EEG is mild generalized flow, and his carotid Doppler showed no significant stenosis. Patient already has neuropsych testing ordered by his PCP, and his wife will pick him up today and go home. Because of his clear memory loss, we will start him on 5 mg of Aricept and his PCP can follow him at home. His metabolic factors including B12 and thyroid are all normal also. Patient remained stable without any new neurological symptoms, and agrees to taking his medication.         Signed:  Tarah Lugo MD  3/28/2018  8:54 PM

## 2018-03-31 LAB
25(OH)D2 SERPL-MCNC: <1 NG/ML
25(OH)D3 SERPL-MCNC: 40 NG/ML
25(OH)D3+25(OH)D2 SERPL-MCNC: 41 NG/ML

## 2018-04-03 LAB
INTERPRETATION, NEU2LT: NORMAL
NEURONAL CELL AB, NEU1LT: 5 UNITS (ref 0–54)